# Patient Record
Sex: MALE | Race: WHITE | Employment: OTHER | URBAN - METROPOLITAN AREA
[De-identification: names, ages, dates, MRNs, and addresses within clinical notes are randomized per-mention and may not be internally consistent; named-entity substitution may affect disease eponyms.]

---

## 2017-12-12 ENCOUNTER — OFFICE VISIT (OUTPATIENT)
Dept: CARDIOLOGY CLINIC | Age: 62
End: 2017-12-12

## 2017-12-12 VITALS
BODY MASS INDEX: 27.92 KG/M2 | DIASTOLIC BLOOD PRESSURE: 76 MMHG | WEIGHT: 194.6 LBS | HEART RATE: 50 BPM | SYSTOLIC BLOOD PRESSURE: 112 MMHG

## 2017-12-12 DIAGNOSIS — I10 ESSENTIAL HYPERTENSION, BENIGN: ICD-10-CM

## 2017-12-12 DIAGNOSIS — I45.6 WPW (WOLFF-PARKINSON-WHITE SYNDROME): Primary | ICD-10-CM

## 2017-12-12 DIAGNOSIS — C67.9 MALIGNANT NEOPLASM OF URINARY BLADDER, UNSPECIFIED SITE (HCC): ICD-10-CM

## 2017-12-12 DIAGNOSIS — I47.1 PAROXYSMAL SUPRAVENTRICULAR TACHYCARDIA (HCC): ICD-10-CM

## 2017-12-12 RX ORDER — FINASTERIDE 5 MG/1
5 TABLET, FILM COATED ORAL
COMMUNITY
End: 2019-06-03 | Stop reason: ALTCHOICE

## 2017-12-12 RX ORDER — ATORVASTATIN CALCIUM 20 MG/1
20 TABLET, FILM COATED ORAL DAILY
COMMUNITY

## 2017-12-12 RX ORDER — TAMSULOSIN HYDROCHLORIDE 0.4 MG/1
0.4 CAPSULE ORAL
COMMUNITY

## 2017-12-12 NOTE — PROGRESS NOTES
HISTORY OF PRESENT ILLNESS  Dalton Panchal is a 60 y.o. male. He is referred for preoperative evaluation prior to having removal of bladder cancer by Dr. Mc Blackmon, which is scheduled to be done in less than one week at [facility].  He was seen previously for supraventricular tachycardia and Nancy-Parkinson-White syndrome by Dr. Catherine Solis. He had a normal echo done four years ago. He also had a normal stress test and was able to walk twelve minutes on the Rudolph protocol. He denies chest pain. He is very active. He walks three miles per day and plays tennis, as well as golf. When he plays golf, he walks all eighteen holes. If he stops the atenolol, which he has been on for twenty years, he will have recurrence of the tachycardia. His heart runs slightly slow on the atenolol. He does not smoke cigarettes or drink alcohol to excess. Family history is noncontributory. HPI  Patient Active Problem List   Diagnosis Code    Paroxysmal supraventricular tachycardia (HCC) I47.1    Other and unspecified hyperlipidemia E78.5    Essential hypertension, benign I10    Chest pain, unspecified R07.9     Current Outpatient Prescriptions   Medication Sig Dispense Refill    atorvastatin (LIPITOR) 20 mg tablet Take  by mouth daily.  tamsulosin (FLOMAX) 0.4 mg capsule Take 0.4 mg by mouth daily.  finasteride (PROSCAR) 5 mg tablet Take 5 mg by mouth daily.  aspirin 81 mg tablet Take  by mouth daily.  atenolol (TENORMIN) 25 mg tablet Take 25 mg by mouth daily.  simvastatin (ZOCOR) 20 mg tablet Take 20 mg by mouth daily. Past Medical History:   Diagnosis Date    Bladder cancer ([facility] [address]     WPW (Nancy-Parkinson-White syndrome)      History reviewed. No pertinent surgical history. Review of Systems   Genitourinary: Positive for hematuria. All other systems reviewed and are negative.     Visit Vitals    /76 (BP 1 Location: Left arm, BP Patient Position: Sitting)  Pulse (!) 50    Wt 194 lb 9.6 oz (88.3 kg)    BMI 27.92 kg/m2       Physical Exam   Constitutional: He is oriented to person, place, and time. He appears well-nourished. HENT:   Head: Atraumatic. Eyes: Conjunctivae are normal.   Neck: Neck supple. Cardiovascular: Regular rhythm and normal heart sounds. Bradycardia present. Exam reveals no gallop and no friction rub. No murmur heard. Pulmonary/Chest: Breath sounds normal. He has no wheezes. Abdominal: Bowel sounds are normal. There is no tenderness. Musculoskeletal: He exhibits no edema. Neurological: He is oriented to person, place, and time. Skin: Skin is dry. Psychiatric: His behavior is normal.   Nursing note and vitals reviewed. ASSESSMENT and PLAN  He has a history of tachycardia and Nancy-Parkinson-White syndrome, but it is controlled on his low-dose betablocker. He may have sick sinus syndrome as well in that he does run a slightly reduced heart rate. He has no evidence for coronary disease and has no history of this. He should do well with his bladder cancer surgery and I feel that he can proceed with the surgery without any further testing. I will see him back on an as-needed basis.

## 2017-12-12 NOTE — MR AVS SNAPSHOT
Visit Information Date & Time Provider Department Dept. Phone Encounter #  
 12/12/2017 10:20 AM Theodora Carrillo MD [facility] [place] [phone] [id number] Your Appointments 12/12/2017 10:20 AM  
New Patient with Theodora Carrillo MD  
[facility] ([facility]) Appt Note: Prev Dr. Jessie Agrawal Clearance/Records in CC/Calling to verify ins 12/8 ricki; Prev Dr. aNif Yao Cancer/Records in CC/Calling to verify ins 12/8 [place] [address]  
[address] [facility],[address] [facility] [address] Upcoming Health Maintenance Date Due Hepatitis C Screening 1955 DTaP/Tdap/Td series (1 - Tdap) 1/25/1975 FOBT Q 1 YEAR AGE 50-75 1/25/2004 ZOSTER VACCINE AGE 60> 11/25/2013 Influenza Age 5 to Adult 8/1/2017 Allergies as of 12/12/2017  Review Complete On: 12/12/2017 By: Esperanza Carey No Known Allergies Current Immunizations  Never Reviewed No immunizations on file. Not reviewed this visit You Were Diagnosed With   
  
 Codes Comments Paroxysmal supraventricular tachycardia (HCC)    -  Primary ICD-10-CM: I47.1 ICD-9-CM: 427.0 Essential hypertension, benign     ICD-10-CM: I10 
ICD-9-CM: 401.1 Vitals BP Pulse Weight(growth percentile) BMI Smoking Status 112/76 (BP 1 Location: Left arm, BP Patient Position: Sitting) (!) 50 194 lb 9.6 oz (88.3 kg) 27.92 kg/m2 Former Smoker Vitals History BMI and BSA Data Body Mass Index Body Surface Area  
 27.92 kg/m 2 2.09 m 2 Preferred Pharmacy Pharmacy Name Phone [id number] [address] [facility] [phone] Your Updated Medication List  
  
   
This list is accurate as of: 12/12/17 10:19 AM.  Always use your most recent med list.  
  
  
  
  
 aspirin 81 mg tablet Take  by mouth daily. atorvastatin 20 mg tablet Commonly known as:  LIPITOR Take  by mouth daily. finasteride 5 mg tablet Commonly known as:  PROSCAR Take 5 mg by mouth daily. FLOMAX 0.4 mg capsule Generic drug:  tamsulosin Take 0.4 mg by mouth daily. TENORMIN 25 mg tablet Generic drug:  atenolol Take 25 mg by mouth daily. ZOCOR 20 mg tablet Generic drug:  simvastatin Take 20 mg by mouth daily. We Performed the Following AMB POC EKG ROUTINE W/ 12 LEADS, INTER & REP [35141 CPT(R)] To-Do List   
 12/13/2017 2:00 PM  
  Appointment with [facility] PAT EXAM RM 1 at [facility] ([phone]) Introducing [facility] & HEALTH SERVICES! Lucina Slaughter introduces [employer] patient portal. Now you can access parts of your medical record, email your doctor's office, and request medication refills online. 1. In your internet browser, go to [email or web address] 2. Click on the First Time User? Click Here link in the Sign In box. You will see the New Member Sign Up page. 3. Enter your [employer] Access Code exactly as it appears below. You will not need to use this code after youve completed the sign-up process. If you do not sign up before the expiration date, you must request a new code. · [employer] Access Code: [email or web address] Expires: 3/12/2018 10:19 AM 
 
4. Enter the last four digits of your Social Security Number (xxxx) and Date of Birth (mm/dd/yyyy) as indicated and click Submit. You will be taken to the next sign-up page. 5. Create a Roomixert ID. This will be your [employer] login ID and cannot be changed, so think of one that is secure and easy to remember. 6. Create a [employer] password. You can change your password at any time. 7. Enter your Password Reset Question and Answer. This can be used at a later time if you forget your password. 8. Enter your e-mail address.  You will receive e-mail notification when new information is available in [employer]. 9. Click Sign Up. You can now view and download portions of your medical record. 10. Click the Download Summary menu link to download a portable copy of your medical information. If you have questions, please visit the Frequently Asked Questions section of the [employer] website. Remember, [employer] is NOT to be used for urgent needs. For medical emergencies, dial 911. Now available from your iPhone and Android! Please provide this summary of care documentation to your next provider. Your primary care clinician is listed as Megan Parekhgle. If you have any questions after today's visit, please call [phone].

## 2017-12-13 ENCOUNTER — HOSPITAL ENCOUNTER (OUTPATIENT)
Dept: PREADMISSION TESTING | Age: 62
Discharge: HOME | End: 2017-12-13
Payer: COMMERCIAL

## 2017-12-13 ENCOUNTER — HOSPITAL ENCOUNTER (OUTPATIENT)
Dept: GENERAL RADIOLOGY | Age: 62
Discharge: HOME | End: 2017-12-13
Attending: UROLOGY
Payer: COMMERCIAL

## 2017-12-13 VITALS
HEIGHT: 68 IN | DIASTOLIC BLOOD PRESSURE: 73 MMHG | WEIGHT: 196.5 LBS | TEMPERATURE: 97.8 F | HEART RATE: 60 BPM | SYSTOLIC BLOOD PRESSURE: 120 MMHG | RESPIRATION RATE: 18 BRPM | BODY MASS INDEX: 29.78 KG/M2

## 2017-12-13 LAB
ALBUMIN SERPL-MCNC: 3.8 G/DL (ref 3.5–5)
ALBUMIN/GLOB SERPL: 1.1 {RATIO} (ref 1.1–2.2)
ALP SERPL-CCNC: 105 U/L (ref 45–117)
ALT SERPL-CCNC: 42 U/L (ref 12–78)
ANION GAP SERPL CALC-SCNC: 6 MMOL/L (ref 5–15)
APPEARANCE UR: CLEAR
APTT PPP: 29.2 SEC (ref 22.1–32.5)
AST SERPL-CCNC: 29 U/L (ref 15–37)
BACTERIA URNS QL MICRO: NEGATIVE /HPF
BASOPHILS # BLD: 0 K/UL (ref 0–0.1)
BASOPHILS NFR BLD: 0 % (ref 0–1)
BILIRUB SERPL-MCNC: 0.4 MG/DL (ref 0.2–1)
BILIRUB UR QL: NEGATIVE
BUN SERPL-MCNC: 24 MG/DL (ref 6–20)
BUN/CREAT SERPL: 21 (ref 12–20)
CALCIUM SERPL-MCNC: 8.7 MG/DL (ref 8.5–10.1)
CHLORIDE SERPL-SCNC: 103 MMOL/L (ref 97–108)
CO2 SERPL-SCNC: 31 MMOL/L (ref 21–32)
COLOR UR: NORMAL
CREAT SERPL-MCNC: 1.17 MG/DL (ref 0.7–1.3)
EOSINOPHIL # BLD: 0.1 K/UL (ref 0–0.4)
EOSINOPHIL NFR BLD: 2 % (ref 0–7)
EPITH CASTS URNS QL MICRO: NORMAL /LPF
ERYTHROCYTE [DISTWIDTH] IN BLOOD BY AUTOMATED COUNT: 12.3 % (ref 11.5–14.5)
GLOBULIN SER CALC-MCNC: 3.4 G/DL (ref 2–4)
GLUCOSE SERPL-MCNC: 109 MG/DL (ref 65–100)
GLUCOSE UR STRIP.AUTO-MCNC: NEGATIVE MG/DL
HCT VFR BLD AUTO: 42.3 % (ref 36.6–50.3)
HGB BLD-MCNC: 14 G/DL (ref 12.1–17)
HGB UR QL STRIP: NEGATIVE
HYALINE CASTS URNS QL MICRO: NORMAL /LPF (ref 0–5)
INR PPP: 1.1 (ref 0.9–1.1)
KETONES UR QL STRIP.AUTO: NEGATIVE MG/DL
LEUKOCYTE ESTERASE UR QL STRIP.AUTO: NEGATIVE
LYMPHOCYTES # BLD: 2.3 K/UL (ref 0.8–3.5)
LYMPHOCYTES NFR BLD: 31 % (ref 12–49)
MCH RBC QN AUTO: 27.8 PG (ref 26–34)
MCHC RBC AUTO-ENTMCNC: 33.1 G/DL (ref 30–36.5)
MCV RBC AUTO: 84.1 FL (ref 80–99)
MONOCYTES # BLD: 0.5 K/UL (ref 0–1)
MONOCYTES NFR BLD: 7 % (ref 5–13)
NEUTS SEG # BLD: 4.4 K/UL (ref 1.8–8)
NEUTS SEG NFR BLD: 60 % (ref 32–75)
NITRITE UR QL STRIP.AUTO: NEGATIVE
PH UR STRIP: 6.5 [PH] (ref 5–8)
PLATELET # BLD AUTO: 216 K/UL (ref 150–400)
POTASSIUM SERPL-SCNC: 4.4 MMOL/L (ref 3.5–5.1)
PROT SERPL-MCNC: 7.2 G/DL (ref 6.4–8.2)
PROT UR STRIP-MCNC: NEGATIVE MG/DL
PROTHROMBIN TIME: 11 SEC (ref 9–11.1)
RBC # BLD AUTO: 5.03 M/UL (ref 4.1–5.7)
RBC #/AREA URNS HPF: NORMAL /HPF (ref 0–5)
SODIUM SERPL-SCNC: 140 MMOL/L (ref 136–145)
SP GR UR REFRACTOMETRY: 1.01 (ref 1–1.03)
THERAPEUTIC RANGE,PTTT: NORMAL SECS (ref 58–77)
UROBILINOGEN UR QL STRIP.AUTO: 0.2 EU/DL (ref 0.2–1)
WBC # BLD AUTO: 7.3 K/UL (ref 4.1–11.1)
WBC URNS QL MICRO: NORMAL /HPF (ref 0–4)

## 2017-12-13 PROCEDURE — 85730 THROMBOPLASTIN TIME PARTIAL: CPT | Performed by: UROLOGY

## 2017-12-13 PROCEDURE — 80053 COMPREHEN METABOLIC PANEL: CPT | Performed by: UROLOGY

## 2017-12-13 PROCEDURE — 81001 URINALYSIS AUTO W/SCOPE: CPT | Performed by: UROLOGY

## 2017-12-13 PROCEDURE — 36415 COLL VENOUS BLD VENIPUNCTURE: CPT | Performed by: UROLOGY

## 2017-12-13 PROCEDURE — 87086 URINE CULTURE/COLONY COUNT: CPT | Performed by: UROLOGY

## 2017-12-13 PROCEDURE — 85025 COMPLETE CBC W/AUTO DIFF WBC: CPT | Performed by: UROLOGY

## 2017-12-13 PROCEDURE — 71020 XR CHEST PA LAT: CPT

## 2017-12-13 PROCEDURE — 85610 PROTHROMBIN TIME: CPT | Performed by: UROLOGY

## 2017-12-13 NOTE — PERIOP NOTES
PATIENT GIVEN SURGICAL SITE INFECTION FAQS INFORMATION HANDOUT. PT HAS BEEN GIVEN THE OPPORTUNITY TO ASK ADDITIONAL QUESTIONS.     PT WAS GIVEN DIRECTIONS TO HAVE CXR COMPLETED

## 2017-12-14 NOTE — PERIOP NOTES
FOLLOW UP:    PAT TEST RESULTS FAXED TO DR. Francisco Mercado OFFICE; MESSAGE LEFT WITH  FOR NURSE TO REVIEW LABS AND CXR RESULTS. PAT PHONE NUMBER GIVEN FOR RETURN CALL IF NEEDED.

## 2017-12-14 NOTE — PERIOP NOTES
FOLLOW UP:    April NURSE WITH DR. Barr [facility] OFFICE, LEFT  STATING THE \"LABS AND CXR HAVE BEEN REVIEWED AND SIGNED OFF BY \"

## 2017-12-15 LAB
BACTERIA SPEC CULT: NORMAL
CC UR VC: NORMAL
SERVICE CMNT-IMP: NORMAL

## 2017-12-18 ENCOUNTER — ANESTHESIA (OUTPATIENT)
Dept: SURGERY | Age: 62
End: 2017-12-18
Payer: COMMERCIAL

## 2017-12-18 ENCOUNTER — HOSPITAL ENCOUNTER (OUTPATIENT)
Age: 62
Setting detail: OBSERVATION
Discharge: HOME | End: 2017-12-19
Attending: UROLOGY | Admitting: UROLOGY
Payer: COMMERCIAL

## 2017-12-18 ENCOUNTER — ANESTHESIA EVENT (OUTPATIENT)
Dept: SURGERY | Age: 62
End: 2017-12-18
Payer: COMMERCIAL

## 2017-12-18 PROBLEM — N32.89 BLADDER MASS: Status: ACTIVE | Noted: 2017-12-18

## 2017-12-18 LAB
ALBUMIN SERPL-MCNC: 3.6 G/DL (ref 3.5–5)
ALBUMIN/GLOB SERPL: 1.2 {RATIO} (ref 1.1–2.2)
ALP SERPL-CCNC: 81 U/L (ref 45–117)
ALT SERPL-CCNC: 36 U/L (ref 12–78)
ANION GAP SERPL CALC-SCNC: 6 MMOL/L (ref 5–15)
AST SERPL-CCNC: 27 U/L (ref 15–37)
BILIRUB SERPL-MCNC: 0.3 MG/DL (ref 0.2–1)
BUN SERPL-MCNC: 21 MG/DL (ref 6–20)
BUN/CREAT SERPL: 16 (ref 12–20)
CALCIUM SERPL-MCNC: 8.7 MG/DL (ref 8.5–10.1)
CHLORIDE SERPL-SCNC: 104 MMOL/L (ref 97–108)
CO2 SERPL-SCNC: 28 MMOL/L (ref 21–32)
CREAT SERPL-MCNC: 1.34 MG/DL (ref 0.7–1.3)
GLOBULIN SER CALC-MCNC: 3.1 G/DL (ref 2–4)
GLUCOSE SERPL-MCNC: 125 MG/DL (ref 65–100)
POTASSIUM SERPL-SCNC: 4.4 MMOL/L (ref 3.5–5.1)
PROT SERPL-MCNC: 6.7 G/DL (ref 6.4–8.2)
SODIUM SERPL-SCNC: 138 MMOL/L (ref 136–145)

## 2017-12-18 PROCEDURE — 77030012893

## 2017-12-18 PROCEDURE — 77030013079 HC BLNKT BAIR HGGR 3M -A: Performed by: ANESTHESIOLOGY

## 2017-12-18 PROCEDURE — 77030021162 HC TU IRR ENDOSC BAXT -A: Performed by: UROLOGY

## 2017-12-18 PROCEDURE — 77030018846 HC SOL IRR STRL H20 ICUM -A: Performed by: UROLOGY

## 2017-12-18 PROCEDURE — 99218 HC RM OBSERVATION: CPT

## 2017-12-18 PROCEDURE — 74011250636 HC RX REV CODE- 250/636

## 2017-12-18 PROCEDURE — 77030032490 HC SLV COMPR SCD KNE COVD -B: Performed by: UROLOGY

## 2017-12-18 PROCEDURE — 80053 COMPREHEN METABOLIC PANEL: CPT | Performed by: UROLOGY

## 2017-12-18 PROCEDURE — 77030011274 HC ELECTRD CUT LP RWOL -F: Performed by: UROLOGY

## 2017-12-18 PROCEDURE — 36415 COLL VENOUS BLD VENIPUNCTURE: CPT | Performed by: UROLOGY

## 2017-12-18 PROCEDURE — 76210000017 HC OR PH I REC 1.5 TO 2 HR: Performed by: UROLOGY

## 2017-12-18 PROCEDURE — 74011250637 HC RX REV CODE- 250/637: Performed by: UROLOGY

## 2017-12-18 PROCEDURE — 74011250636 HC RX REV CODE- 250/636: Performed by: UROLOGY

## 2017-12-18 PROCEDURE — 74011000258 HC RX REV CODE- 258: Performed by: UROLOGY

## 2017-12-18 PROCEDURE — 74011000250 HC RX REV CODE- 250

## 2017-12-18 PROCEDURE — 77030005521 HC CATH URETH FOL38 BARD -B: Performed by: UROLOGY

## 2017-12-18 PROCEDURE — 77030020782 HC GWN BAIR PAWS FLX 3M -B

## 2017-12-18 PROCEDURE — 77030010545

## 2017-12-18 PROCEDURE — 77030018830 HC SOL IRR GLYC ICUM-A: Performed by: UROLOGY

## 2017-12-18 PROCEDURE — 74011250636 HC RX REV CODE- 250/636: Performed by: ANESTHESIOLOGY

## 2017-12-18 PROCEDURE — 76060000033 HC ANESTHESIA 1 TO 1.5 HR: Performed by: UROLOGY

## 2017-12-18 PROCEDURE — 76010000149 HC OR TIME 1 TO 1.5 HR: Performed by: UROLOGY

## 2017-12-18 PROCEDURE — 77030011640 HC PAD GRND REM COVD -A: Performed by: UROLOGY

## 2017-12-18 PROCEDURE — 77030021707 HC SET IRR FLD WRMR 3M -B: Performed by: UROLOGY

## 2017-12-18 PROCEDURE — C1769 GUIDE WIRE: HCPCS | Performed by: UROLOGY

## 2017-12-18 PROCEDURE — 77030010509 HC AIRWY LMA MSK TELE -A: Performed by: ANESTHESIOLOGY

## 2017-12-18 PROCEDURE — 74011000250 HC RX REV CODE- 250: Performed by: UROLOGY

## 2017-12-18 PROCEDURE — 77010033678 HC OXYGEN DAILY

## 2017-12-18 RX ORDER — PROPOFOL 10 MG/ML
INJECTION, EMULSION INTRAVENOUS AS NEEDED
Status: DISCONTINUED | OUTPATIENT
Start: 2017-12-18 | End: 2017-12-18 | Stop reason: HOSPADM

## 2017-12-18 RX ORDER — SODIUM CHLORIDE 9 MG/ML
25 INJECTION, SOLUTION INTRAVENOUS CONTINUOUS
Status: DISCONTINUED | OUTPATIENT
Start: 2017-12-18 | End: 2017-12-18 | Stop reason: HOSPADM

## 2017-12-18 RX ORDER — SODIUM CHLORIDE, SODIUM LACTATE, POTASSIUM CHLORIDE, CALCIUM CHLORIDE 600; 310; 30; 20 MG/100ML; MG/100ML; MG/100ML; MG/100ML
75 INJECTION, SOLUTION INTRAVENOUS CONTINUOUS
Status: DISCONTINUED | OUTPATIENT
Start: 2017-12-18 | End: 2017-12-18 | Stop reason: SDUPTHER

## 2017-12-18 RX ORDER — FENTANYL CITRATE 50 UG/ML
25 INJECTION, SOLUTION INTRAMUSCULAR; INTRAVENOUS
Status: COMPLETED | OUTPATIENT
Start: 2017-12-18 | End: 2017-12-18

## 2017-12-18 RX ORDER — CEFAZOLIN SODIUM/WATER 2 G/20 ML
2 SYRINGE (ML) INTRAVENOUS ONCE
Status: COMPLETED | OUTPATIENT
Start: 2017-12-18 | End: 2017-12-18

## 2017-12-18 RX ORDER — HYDROCODONE BITARTRATE AND ACETAMINOPHEN 5; 325 MG/1; MG/1
1 TABLET ORAL
Status: DISCONTINUED | OUTPATIENT
Start: 2017-12-18 | End: 2017-12-19 | Stop reason: HOSPADM

## 2017-12-18 RX ORDER — ONDANSETRON 2 MG/ML
INJECTION INTRAMUSCULAR; INTRAVENOUS AS NEEDED
Status: DISCONTINUED | OUTPATIENT
Start: 2017-12-18 | End: 2017-12-18 | Stop reason: HOSPADM

## 2017-12-18 RX ORDER — DEXTROSE MONOHYDRATE AND SODIUM CHLORIDE 5; .45 G/100ML; G/100ML
100 INJECTION, SOLUTION INTRAVENOUS CONTINUOUS
Status: DISCONTINUED | OUTPATIENT
Start: 2017-12-18 | End: 2017-12-19 | Stop reason: HOSPADM

## 2017-12-18 RX ORDER — LIDOCAINE HYDROCHLORIDE 20 MG/ML
INJECTION, SOLUTION EPIDURAL; INFILTRATION; INTRACAUDAL; PERINEURAL AS NEEDED
Status: DISCONTINUED | OUTPATIENT
Start: 2017-12-18 | End: 2017-12-18 | Stop reason: HOSPADM

## 2017-12-18 RX ORDER — ONDANSETRON 2 MG/ML
4 INJECTION INTRAMUSCULAR; INTRAVENOUS AS NEEDED
Status: DISCONTINUED | OUTPATIENT
Start: 2017-12-18 | End: 2017-12-18 | Stop reason: SDUPTHER

## 2017-12-18 RX ORDER — OXYBUTYNIN CHLORIDE 5 MG/1
5 TABLET ORAL 3 TIMES DAILY
Status: DISCONTINUED | OUTPATIENT
Start: 2017-12-18 | End: 2017-12-19 | Stop reason: HOSPADM

## 2017-12-18 RX ORDER — MIDAZOLAM HYDROCHLORIDE 1 MG/ML
1 INJECTION, SOLUTION INTRAMUSCULAR; INTRAVENOUS AS NEEDED
Status: DISCONTINUED | OUTPATIENT
Start: 2017-12-18 | End: 2017-12-18 | Stop reason: HOSPADM

## 2017-12-18 RX ORDER — ROPIVACAINE HYDROCHLORIDE 5 MG/ML
30 INJECTION, SOLUTION EPIDURAL; INFILTRATION; PERINEURAL ONCE
Status: DISCONTINUED | OUTPATIENT
Start: 2017-12-18 | End: 2017-12-18 | Stop reason: HOSPADM

## 2017-12-18 RX ORDER — FENTANYL CITRATE 50 UG/ML
50 INJECTION, SOLUTION INTRAMUSCULAR; INTRAVENOUS AS NEEDED
Status: DISCONTINUED | OUTPATIENT
Start: 2017-12-18 | End: 2017-12-18 | Stop reason: HOSPADM

## 2017-12-18 RX ORDER — SODIUM CHLORIDE 0.9 % (FLUSH) 0.9 %
5-10 SYRINGE (ML) INJECTION EVERY 8 HOURS
Status: DISCONTINUED | OUTPATIENT
Start: 2017-12-18 | End: 2017-12-18 | Stop reason: HOSPADM

## 2017-12-18 RX ORDER — SODIUM CHLORIDE 0.9 % (FLUSH) 0.9 %
5-10 SYRINGE (ML) INJECTION AS NEEDED
Status: DISCONTINUED | OUTPATIENT
Start: 2017-12-18 | End: 2017-12-18 | Stop reason: HOSPADM

## 2017-12-18 RX ORDER — FENTANYL CITRATE 50 UG/ML
INJECTION, SOLUTION INTRAMUSCULAR; INTRAVENOUS AS NEEDED
Status: DISCONTINUED | OUTPATIENT
Start: 2017-12-18 | End: 2017-12-18 | Stop reason: HOSPADM

## 2017-12-18 RX ORDER — ATROPA BELLADONNA AND OPIUM 16.2; 6 MG/1; MG/1
1 SUPPOSITORY RECTAL
Status: DISCONTINUED | OUTPATIENT
Start: 2017-12-18 | End: 2017-12-19 | Stop reason: HOSPADM

## 2017-12-18 RX ORDER — EPHEDRINE SULFATE 50 MG/ML
INJECTION, SOLUTION INTRAVENOUS AS NEEDED
Status: DISCONTINUED | OUTPATIENT
Start: 2017-12-18 | End: 2017-12-18 | Stop reason: HOSPADM

## 2017-12-18 RX ORDER — ACETAMINOPHEN 10 MG/ML
INJECTION, SOLUTION INTRAVENOUS AS NEEDED
Status: DISCONTINUED | OUTPATIENT
Start: 2017-12-18 | End: 2017-12-18 | Stop reason: HOSPADM

## 2017-12-18 RX ORDER — MIDAZOLAM HYDROCHLORIDE 1 MG/ML
0.5 INJECTION, SOLUTION INTRAMUSCULAR; INTRAVENOUS
Status: DISCONTINUED | OUTPATIENT
Start: 2017-12-18 | End: 2017-12-18 | Stop reason: SDUPTHER

## 2017-12-18 RX ORDER — DEXAMETHASONE SODIUM PHOSPHATE 4 MG/ML
INJECTION, SOLUTION INTRA-ARTICULAR; INTRALESIONAL; INTRAMUSCULAR; INTRAVENOUS; SOFT TISSUE AS NEEDED
Status: DISCONTINUED | OUTPATIENT
Start: 2017-12-18 | End: 2017-12-18 | Stop reason: HOSPADM

## 2017-12-18 RX ORDER — ROCURONIUM BROMIDE 10 MG/ML
INJECTION, SOLUTION INTRAVENOUS AS NEEDED
Status: DISCONTINUED | OUTPATIENT
Start: 2017-12-18 | End: 2017-12-18 | Stop reason: HOSPADM

## 2017-12-18 RX ORDER — SODIUM CHLORIDE, SODIUM LACTATE, POTASSIUM CHLORIDE, CALCIUM CHLORIDE 600; 310; 30; 20 MG/100ML; MG/100ML; MG/100ML; MG/100ML
50 INJECTION, SOLUTION INTRAVENOUS CONTINUOUS
Status: DISCONTINUED | OUTPATIENT
Start: 2017-12-18 | End: 2017-12-18 | Stop reason: HOSPADM

## 2017-12-18 RX ORDER — PHENAZOPYRIDINE HYDROCHLORIDE 100 MG/1
100 TABLET, FILM COATED ORAL 3 TIMES DAILY
Status: DISCONTINUED | OUTPATIENT
Start: 2017-12-18 | End: 2017-12-19 | Stop reason: HOSPADM

## 2017-12-18 RX ORDER — SODIUM CHLORIDE, SODIUM LACTATE, POTASSIUM CHLORIDE, CALCIUM CHLORIDE 600; 310; 30; 20 MG/100ML; MG/100ML; MG/100ML; MG/100ML
125 INJECTION, SOLUTION INTRAVENOUS CONTINUOUS
Status: DISCONTINUED | OUTPATIENT
Start: 2017-12-18 | End: 2017-12-18 | Stop reason: HOSPADM

## 2017-12-18 RX ORDER — LIDOCAINE HYDROCHLORIDE 10 MG/ML
0.1 INJECTION, SOLUTION EPIDURAL; INFILTRATION; INTRACAUDAL; PERINEURAL AS NEEDED
Status: DISCONTINUED | OUTPATIENT
Start: 2017-12-18 | End: 2017-12-18 | Stop reason: HOSPADM

## 2017-12-18 RX ORDER — MIDAZOLAM HYDROCHLORIDE 1 MG/ML
INJECTION, SOLUTION INTRAMUSCULAR; INTRAVENOUS AS NEEDED
Status: DISCONTINUED | OUTPATIENT
Start: 2017-12-18 | End: 2017-12-18 | Stop reason: HOSPADM

## 2017-12-18 RX ORDER — SODIUM CHLORIDE 0.9 % (FLUSH) 0.9 %
5-10 SYRINGE (ML) INJECTION AS NEEDED
Status: DISCONTINUED | OUTPATIENT
Start: 2017-12-18 | End: 2017-12-19 | Stop reason: HOSPADM

## 2017-12-18 RX ORDER — DIPHENHYDRAMINE HYDROCHLORIDE 50 MG/ML
12.5 INJECTION, SOLUTION INTRAMUSCULAR; INTRAVENOUS AS NEEDED
Status: DISCONTINUED | OUTPATIENT
Start: 2017-12-18 | End: 2017-12-18 | Stop reason: SDUPTHER

## 2017-12-18 RX ORDER — ONDANSETRON 2 MG/ML
4 INJECTION INTRAMUSCULAR; INTRAVENOUS AS NEEDED
Status: DISCONTINUED | OUTPATIENT
Start: 2017-12-18 | End: 2017-12-18 | Stop reason: HOSPADM

## 2017-12-18 RX ORDER — SODIUM CHLORIDE, SODIUM LACTATE, POTASSIUM CHLORIDE, CALCIUM CHLORIDE 600; 310; 30; 20 MG/100ML; MG/100ML; MG/100ML; MG/100ML
75 INJECTION, SOLUTION INTRAVENOUS CONTINUOUS
Status: DISCONTINUED | OUTPATIENT
Start: 2017-12-18 | End: 2017-12-18 | Stop reason: HOSPADM

## 2017-12-18 RX ORDER — DIPHENHYDRAMINE HYDROCHLORIDE 50 MG/ML
12.5 INJECTION, SOLUTION INTRAMUSCULAR; INTRAVENOUS AS NEEDED
Status: DISCONTINUED | OUTPATIENT
Start: 2017-12-18 | End: 2017-12-18 | Stop reason: HOSPADM

## 2017-12-18 RX ORDER — MORPHINE SULFATE 10 MG/ML
2 INJECTION, SOLUTION INTRAMUSCULAR; INTRAVENOUS
Status: DISCONTINUED | OUTPATIENT
Start: 2017-12-18 | End: 2017-12-18 | Stop reason: HOSPADM

## 2017-12-18 RX ORDER — FENTANYL CITRATE 50 UG/ML
INJECTION, SOLUTION INTRAMUSCULAR; INTRAVENOUS
Status: DISCONTINUED
Start: 2017-12-18 | End: 2017-12-18

## 2017-12-18 RX ORDER — LORAZEPAM 2 MG/ML
1 INJECTION INTRAMUSCULAR
Status: DISCONTINUED | OUTPATIENT
Start: 2017-12-18 | End: 2017-12-19 | Stop reason: HOSPADM

## 2017-12-18 RX ORDER — FENTANYL CITRATE 50 UG/ML
25 INJECTION, SOLUTION INTRAMUSCULAR; INTRAVENOUS
Status: DISCONTINUED | OUTPATIENT
Start: 2017-12-18 | End: 2017-12-18 | Stop reason: SDUPTHER

## 2017-12-18 RX ORDER — MIDAZOLAM HYDROCHLORIDE 1 MG/ML
0.5 INJECTION, SOLUTION INTRAMUSCULAR; INTRAVENOUS
Status: DISCONTINUED | OUTPATIENT
Start: 2017-12-18 | End: 2017-12-18 | Stop reason: HOSPADM

## 2017-12-18 RX ORDER — ATENOLOL 25 MG/1
25 TABLET ORAL DAILY
Status: DISCONTINUED | OUTPATIENT
Start: 2017-12-19 | End: 2017-12-19 | Stop reason: HOSPADM

## 2017-12-18 RX ORDER — BACITRACIN 500 UNIT/G
1 PACKET (EA) TOPICAL 3 TIMES DAILY
Status: DISCONTINUED | OUTPATIENT
Start: 2017-12-18 | End: 2017-12-19 | Stop reason: HOSPADM

## 2017-12-18 RX ORDER — MORPHINE SULFATE 10 MG/ML
2 INJECTION, SOLUTION INTRAMUSCULAR; INTRAVENOUS
Status: DISCONTINUED | OUTPATIENT
Start: 2017-12-18 | End: 2017-12-18 | Stop reason: SDUPTHER

## 2017-12-18 RX ORDER — SODIUM CHLORIDE 0.9 % (FLUSH) 0.9 %
5-10 SYRINGE (ML) INJECTION EVERY 8 HOURS
Status: DISCONTINUED | OUTPATIENT
Start: 2017-12-18 | End: 2017-12-19 | Stop reason: HOSPADM

## 2017-12-18 RX ORDER — SODIUM CHLORIDE 0.9 % (FLUSH) 0.9 %
5-10 SYRINGE (ML) INJECTION AS NEEDED
Status: DISCONTINUED | OUTPATIENT
Start: 2017-12-18 | End: 2017-12-18 | Stop reason: SDUPTHER

## 2017-12-18 RX ORDER — SUCCINYLCHOLINE CHLORIDE 20 MG/ML
INJECTION INTRAMUSCULAR; INTRAVENOUS AS NEEDED
Status: DISCONTINUED | OUTPATIENT
Start: 2017-12-18 | End: 2017-12-18 | Stop reason: HOSPADM

## 2017-12-18 RX ORDER — SODIUM CHLORIDE 9 MG/ML
25 INJECTION, SOLUTION INTRAVENOUS CONTINUOUS
Status: DISCONTINUED | OUTPATIENT
Start: 2017-12-18 | End: 2017-12-18 | Stop reason: SDUPTHER

## 2017-12-18 RX ADMIN — BACITRACIN 1 PACKET: 500 OINTMENT TOPICAL at 14:22

## 2017-12-18 RX ADMIN — DEXAMETHASONE SODIUM PHOSPHATE 4 MG: 4 INJECTION, SOLUTION INTRA-ARTICULAR; INTRALESIONAL; INTRAMUSCULAR; INTRAVENOUS; SOFT TISSUE at 08:53

## 2017-12-18 RX ADMIN — FENTANYL CITRATE 25 MCG: 50 INJECTION, SOLUTION INTRAMUSCULAR; INTRAVENOUS at 10:18

## 2017-12-18 RX ADMIN — EPHEDRINE SULFATE 10 MG: 50 INJECTION, SOLUTION INTRAVENOUS at 08:47

## 2017-12-18 RX ADMIN — Medication 2 G: at 08:40

## 2017-12-18 RX ADMIN — Medication 10 ML: at 21:25

## 2017-12-18 RX ADMIN — FENTANYL CITRATE 25 MCG: 50 INJECTION, SOLUTION INTRAMUSCULAR; INTRAVENOUS at 10:00

## 2017-12-18 RX ADMIN — BACITRACIN 1 PACKET: 500 OINTMENT TOPICAL at 21:22

## 2017-12-18 RX ADMIN — LIDOCAINE HYDROCHLORIDE 50 MG: 20 INJECTION, SOLUTION EPIDURAL; INFILTRATION; INTRACAUDAL; PERINEURAL at 08:34

## 2017-12-18 RX ADMIN — MIDAZOLAM HYDROCHLORIDE 2 MG: 1 INJECTION, SOLUTION INTRAMUSCULAR; INTRAVENOUS at 08:24

## 2017-12-18 RX ADMIN — MIDAZOLAM HYDROCHLORIDE 1 MG: 1 INJECTION, SOLUTION INTRAMUSCULAR; INTRAVENOUS at 08:33

## 2017-12-18 RX ADMIN — EPHEDRINE SULFATE 10 MG: 50 INJECTION, SOLUTION INTRAVENOUS at 08:53

## 2017-12-18 RX ADMIN — HYDROCODONE BITARTRATE AND ACETAMINOPHEN 1 TABLET: 5; 325 TABLET ORAL at 16:20

## 2017-12-18 RX ADMIN — ACETAMINOPHEN 1000 MG: 10 INJECTION, SOLUTION INTRAVENOUS at 08:48

## 2017-12-18 RX ADMIN — DEXTROSE MONOHYDRATE AND SODIUM CHLORIDE 100 ML/HR: 5; .45 INJECTION, SOLUTION INTRAVENOUS at 21:27

## 2017-12-18 RX ADMIN — EPHEDRINE SULFATE 10 MG: 50 INJECTION, SOLUTION INTRAVENOUS at 09:03

## 2017-12-18 RX ADMIN — OXYBUTYNIN CHLORIDE 5 MG: 5 TABLET ORAL at 14:22

## 2017-12-18 RX ADMIN — HYDROCODONE BITARTRATE AND ACETAMINOPHEN 1 TABLET: 5; 325 TABLET ORAL at 22:46

## 2017-12-18 RX ADMIN — PROPOFOL 200 MG: 10 INJECTION, EMULSION INTRAVENOUS at 08:34

## 2017-12-18 RX ADMIN — MIDAZOLAM HYDROCHLORIDE 2 MG: 1 INJECTION, SOLUTION INTRAMUSCULAR; INTRAVENOUS at 08:23

## 2017-12-18 RX ADMIN — SUCCINYLCHOLINE CHLORIDE 120 MG: 20 INJECTION INTRAMUSCULAR; INTRAVENOUS at 09:27

## 2017-12-18 RX ADMIN — SODIUM CHLORIDE, POTASSIUM CHLORIDE, SODIUM LACTATE AND CALCIUM CHLORIDE: 600; 310; 30; 20 INJECTION, SOLUTION INTRAVENOUS at 08:23

## 2017-12-18 RX ADMIN — PHENAZOPYRIDINE HYDROCHLORIDE 100 MG: 100 TABLET ORAL at 14:22

## 2017-12-18 RX ADMIN — PROPOFOL 50 MG: 10 INJECTION, EMULSION INTRAVENOUS at 08:47

## 2017-12-18 RX ADMIN — DEXTROSE MONOHYDRATE AND SODIUM CHLORIDE 100 ML/HR: 5; .45 INJECTION, SOLUTION INTRAVENOUS at 11:23

## 2017-12-18 RX ADMIN — PHENAZOPYRIDINE HYDROCHLORIDE 100 MG: 100 TABLET ORAL at 21:22

## 2017-12-18 RX ADMIN — FENTANYL CITRATE 50 MCG: 50 INJECTION, SOLUTION INTRAMUSCULAR; INTRAVENOUS at 08:47

## 2017-12-18 RX ADMIN — FENTANYL CITRATE 25 MCG: 50 INJECTION, SOLUTION INTRAMUSCULAR; INTRAVENOUS at 10:07

## 2017-12-18 RX ADMIN — FENTANYL CITRATE 25 MCG: 50 INJECTION, SOLUTION INTRAMUSCULAR; INTRAVENOUS at 10:30

## 2017-12-18 RX ADMIN — ONDANSETRON 4 MG: 2 INJECTION INTRAMUSCULAR; INTRAVENOUS at 09:37

## 2017-12-18 RX ADMIN — FENTANYL CITRATE 25 MCG: 50 INJECTION, SOLUTION INTRAMUSCULAR; INTRAVENOUS at 09:24

## 2017-12-18 RX ADMIN — OXYBUTYNIN CHLORIDE 5 MG: 5 TABLET ORAL at 21:22

## 2017-12-18 RX ADMIN — ROCURONIUM BROMIDE 6 MG: 10 INJECTION, SOLUTION INTRAVENOUS at 09:27

## 2017-12-18 NOTE — ADDENDUM NOTE
Addendum  created 12/18/17 1201 by Macario Campbell CRNA    Anesthesia Event edited, Procedure Event Log accessed

## 2017-12-18 NOTE — IP AVS SNAPSHOT
[address] [facility]  
[phone] Patient: Meme Diallo MRN: [id number] DWZ:5/62/5899 My Medications STOP taking these medications   
 aspirin 81 mg tablet TAKE these medications as instructed Instructions Each Dose to Equal  
 Morning Noon Evening Bedtime  
 atorvastatin 20 mg tablet Commonly known as:  LIPITOR Your last dose was: Your next dose is: Take  by mouth daily. cephALEXin 500 mg capsule Commonly known as:  Lugene Wisam Your last dose was: Your next dose is: Take 1 Cap by mouth four (4) times daily for 5 days. 500 mg  
    
   
   
   
  
 finasteride 5 mg tablet Commonly known as:  PROSCAR Your last dose was: Your next dose is: Take 5 mg by mouth nightly. 5 mg FLOMAX 0.4 mg capsule Generic drug:  tamsulosin Your last dose was: Your next dose is: Take 0.4 mg by mouth nightly. 0.4 mg HYDROcodone-acetaminophen 5-325 mg per tablet Commonly known as:  Clara Huff Your last dose was: Your next dose is: Take 1 Tab by mouth every six (6) hours as needed for Pain. Max Daily Amount: 4 Tabs. Indications: Pain 1 Tab TENORMIN 25 mg tablet Generic drug:  atenolol Your last dose was: Your next dose is: Take 25 mg by mouth daily. 25 mg Where to Get Your Medications Information on where to get these meds will be given to you by the nurse or doctor. ! Ask your nurse or doctor about these medications  
  cephALEXin 500 mg capsule HYDROcodone-acetaminophen 5-325 mg per tablet

## 2017-12-18 NOTE — ANESTHESIA POSTPROCEDURE EVALUATION
Post-Anesthesia Evaluation and Assessment    Patient: Erma Dickens MRN: [id number]  SSN: [id number]    YOB: 1955  Age: 60 y.o. Sex: male       Cardiovascular Function/Vital Signs  Visit Vitals    /76    Pulse 61    Temp 36.6 °C (97.9 °F)    Resp 11    Ht 5' 8\" (1.727 m)    Wt 89.1 kg (196 lb 8 oz)    SpO2 96%    BMI 29.88 kg/m2       Patient is status post general anesthesia for Procedure(s):  CYSTOCSCOPY, INCISION OF URETRHAL STRICTURE, VISUAL URETHROTOMY . Nausea/Vomiting: None    Postoperative hydration reviewed and adequate. Pain:  Pain Scale 1: FLACC (12/18/17 1000)  Pain Intensity 1: 0 (12/18/17 0748)   Managed    Neurological Status:   Neuro (WDL): Within Defined Limits (12/18/17 1123)   At baseline    Mental Status and Level of Consciousness: Arousable    Pulmonary Status:   O2 Device: Nasal cannula (12/18/17 0953)   Adequate oxygenation and airway patent    Complications related to anesthesia: None    Post-anesthesia assessment completed.  No concerns    Signed By: Kristina Ling MD     December 18, 2017

## 2017-12-18 NOTE — PERIOP NOTES
TRANSFER - OUT REPORT:    Verbal report given to [facility] RN(name) on Odalis Allen  being transferred to 522(unit) for routine progression of care       Report consisted of patients Situation, Background, Assessment and   Recommendations(SBAR). Information from the following report(s) SBAR, OR Summary and Intake/Output was reviewed with the receiving nurse. Lines:   Peripheral IV 12/18/17 Left Wrist (Active)   Site Assessment Clean, dry, & intact 12/18/2017  9:53 AM   Phlebitis Assessment 0 12/18/2017  9:53 AM   Infiltration Assessment 0 12/18/2017  9:53 AM   Dressing Status Clean, dry, & intact 12/18/2017  9:53 AM        Opportunity for questions and clarification was provided.       Patient transported with:   [employer]

## 2017-12-18 NOTE — IP AVS SNAPSHOT
[address] [facility] [address] [facility] 
[phone] Patient: Merry Warren MRN: [id number] [facility]:[birth year] About your hospitalization You were admitted on:  December 18, 2017 You last received care in the:  [place] [address] You were discharged on:  December 19, 2017 Why you were hospitalized Your primary diagnosis was:  Not on File Your diagnoses also included:  Bladder Mass Things You Need To Do (next 8 weeks) Follow up with Cherelle Causey MD  
  
Phone:  [phone] Where:  [address] [facility], [address] [facility] Discharge Orders None A check jayden indicates which time of day the medication should be taken. My Medications STOP taking these medications   
 aspirin 81 mg tablet TAKE these medications as instructed Instructions Each Dose to Equal  
 Morning Noon Evening Bedtime  
 atorvastatin 20 mg tablet Commonly known as:  LIPITOR Your last dose was: Your next dose is: Take  by mouth daily. cephALEXin 500 mg capsule Commonly known as:  David Martin Your last dose was: Your next dose is: Take 1 Cap by mouth four (4) times daily for 5 days. 500 mg  
    
   
   
   
  
 finasteride 5 mg tablet Commonly known as:  PROSCAR Your last dose was: Your next dose is: Take 5 mg by mouth nightly. 5 mg FLOMAX 0.4 mg capsule Generic drug:  tamsulosin Your last dose was: Your next dose is: Take 0.4 mg by mouth nightly. 0.4 mg HYDROcodone-acetaminophen 5-325 mg per tablet Commonly known as:  Edroy Boast Your last dose was: Your next dose is: Take 1 Tab by mouth every six (6) hours as needed for Pain. Max Daily Amount: 4 Tabs. Indications: Pain 1 Tab TENORMIN 25 mg tablet Generic drug:  atenolol Your last dose was: Your next dose is: Take 25 mg by mouth daily. 25 mg Where to Get Your Medications Information on where to get these meds will be given to you by the nurse or doctor. ! Ask your nurse or doctor about these medications  
  cephALEXin 500 mg capsule HYDROcodone-acetaminophen 5-325 mg per tablet Discharge Instructions Patient Discharge Instructions Ilya Notice / [id number] : 1955 Admitted 2017 Discharged: 2017 Take Home Medications · It is important that you take the medication exactly as they are prescribed. · Keep your medication in the bottles provided by the pharmacist and keep a list of the medication names, dosages, and times to be taken in your wallet. · Do not take other medications without consulting your doctor. What to do at [facility] Recommended activity: No Lifting for 6 weeks. Follow-up with [facility]. Call for an appointment (if not already scheduled)            [phone]. CALL DINESH, MY  TO GET DETAILS REGARDING READMIT ON Thursday. THANKS Information obtained by : 
I understand that if any problems occur once I am at home I am to contact my physician. I understand and acknowledge receipt of the instructions indicated above. Physician's or R.N.'s Signature                                                                  Date/Time Patient or Representative Signature                                                          Date/Time Introducing [facility] & HEALTH SERVICES! Stanley Abrams introduces [employer] patient portal. Now you can access parts of your medical record, email your doctor's office, and request medication refills online. 1. In your internet browser, go to [email or web address] 2. Click on the First Time User? Click Here link in the Sign In box. You will see the New Member Sign Up page. 3. Enter your [employer] Access Code exactly as it appears below. You will not need to use this code after youve completed the sign-up process. If you do not sign up before the expiration date, you must request a new code. · [employer] Access Code: [email or web address] Expires: 3/12/2018 10:19 AM 
 
4. Enter the last four digits of your Social Security Number (xxxx) and Date of Birth (mm/dd/yyyy) as indicated and click Submit. You will be taken to the next sign-up page. 5. Create a [employer] ID. This will be your [employer] login ID and cannot be changed, so think of one that is secure and easy to remember. 6. Create a [employer] password. You can change your password at any time. 7. Enter your Password Reset Question and Answer. This can be used at a later time if you forget your password. 8. Enter your e-mail address. You will receive e-mail notification when new information is available in [address] 9. Click Sign Up. You can now view and download portions of your medical record. 10. Click the Download Summary menu link to download a portable copy of your medical information. If you have questions, please visit the Frequently Asked Questions section of the [employer] website. Remember, [employer] is NOT to be used for urgent needs. For medical emergencies, dial 911. Now available from your iPhone and Android! Providers Seen During Your Hospitalization Provider Specialty Primary office phone Soni Gabriel MD Urology [phone] Your Primary Care Physician (PCP) Primary Care Physician Office Phone Office Fax [facility] [phone] You are allergic to the following No active allergies Recent Documentation Height Weight BMI Smoking Status 1.727 m 95.5 kg 32.02 kg/m2 Former Smoker Emergency Contacts Name Discharge Info Relation Home Work Mobile [facility] DISCHARGE CAREGIVER [3] Spouse [3] [phone] Patient Belongings The following personal items are in your possession at time of discharge: 
  Dental Appliances: Other (comment) (crowns upper front)  Visual Aid: None             Clothing: Other (comment) (clothing) Please provide this summary of care documentation to your next provider. Signatures-by signing, you are acknowledging that this After Visit Summary has been reviewed with you and you have received a copy. Patient Signature:  ____________________________________________________________ Date:  ____________________________________________________________  
  
[facility] Provider Signature:  ____________________________________________________________ Date:  ____________________________________________________________

## 2017-12-18 NOTE — BRIEF OP NOTE
BRIEF OPERATIVE NOTE    Date of Procedure: 12/18/2017   Preoperative Diagnosis: BLADDER MASS  Postoperative Diagnosis: BLADDER MASS    Procedure(s):  CYSTOCSCOPY, INCISION OF URETRHAL STRICTURE, VISUAL URETHROTOMY   Surgeon(s) and Role:     * Jung Hendricks MD - Primary         Assistant Staff:       Surgical Staff:  Circ-1: Tabby Soto RN  Scrub RN-1: Paty Kiran RN  Event Time In   Incision Start 8666   Incision Close 4190     Anesthesia: General   Estimated Blood Loss: 50  Specimens: * No specimens in log *   Findings: long urethral stricture. Unable to get open enough to pass resectoscope.      Complications: 0  Implants: * No implants in log *

## 2017-12-19 VITALS
TEMPERATURE: 98 F | SYSTOLIC BLOOD PRESSURE: 120 MMHG | BODY MASS INDEX: 31.92 KG/M2 | HEIGHT: 68 IN | DIASTOLIC BLOOD PRESSURE: 70 MMHG | WEIGHT: 210.6 LBS | RESPIRATION RATE: 16 BRPM | HEART RATE: 80 BPM | OXYGEN SATURATION: 98 %

## 2017-12-19 LAB — HGB BLD-MCNC: 12.6 G/DL (ref 12.1–17)

## 2017-12-19 PROCEDURE — 74011250637 HC RX REV CODE- 250/637: Performed by: UROLOGY

## 2017-12-19 PROCEDURE — 99218 HC RM OBSERVATION: CPT

## 2017-12-19 PROCEDURE — 85018 HEMOGLOBIN: CPT | Performed by: UROLOGY

## 2017-12-19 PROCEDURE — 74011000258 HC RX REV CODE- 258: Performed by: UROLOGY

## 2017-12-19 PROCEDURE — 74011000250 HC RX REV CODE- 250: Performed by: UROLOGY

## 2017-12-19 PROCEDURE — 36415 COLL VENOUS BLD VENIPUNCTURE: CPT | Performed by: UROLOGY

## 2017-12-19 PROCEDURE — 77010033678 HC OXYGEN DAILY

## 2017-12-19 RX ORDER — ASPIRIN 81 MG/1
81 TABLET ORAL DAILY
COMMUNITY
End: 2017-12-24

## 2017-12-19 RX ORDER — CEPHALEXIN 500 MG/1
500 CAPSULE ORAL 4 TIMES DAILY
Qty: 20 CAP | Refills: 0 | Status: SHIPPED | OUTPATIENT
Start: 2017-12-19 | End: 2017-12-24

## 2017-12-19 RX ORDER — HYDROCODONE BITARTRATE AND ACETAMINOPHEN 5; 325 MG/1; MG/1
1 TABLET ORAL
Qty: 25 TAB | Refills: 0 | Status: SHIPPED | OUTPATIENT
Start: 2017-12-19 | End: 2019-06-03 | Stop reason: ALTCHOICE

## 2017-12-19 RX ADMIN — OXYBUTYNIN CHLORIDE 5 MG: 5 TABLET ORAL at 09:24

## 2017-12-19 RX ADMIN — PHENAZOPYRIDINE HYDROCHLORIDE 100 MG: 100 TABLET ORAL at 09:15

## 2017-12-19 RX ADMIN — ATENOLOL 25 MG: 25 TABLET ORAL at 09:15

## 2017-12-19 RX ADMIN — Medication 10 ML: at 05:11

## 2017-12-19 RX ADMIN — HYDROCODONE BITARTRATE AND ACETAMINOPHEN 1 TABLET: 5; 325 TABLET ORAL at 05:25

## 2017-12-19 RX ADMIN — BACITRACIN 1 PACKET: 500 OINTMENT TOPICAL at 09:18

## 2017-12-19 RX ADMIN — DEXTROSE MONOHYDRATE AND SODIUM CHLORIDE 100 ML/HR: 5; .45 INJECTION, SOLUTION INTRAVENOUS at 07:20

## 2017-12-19 NOTE — PROGRESS NOTES
Bedside and Verbal shift change report given to [facility], RN (oncoming nurse) by Petey Titus RN (offgoing nurse). Report included the following information SBAR, Kardex, ED Summary, Intake/Output, MAR and Recent Results.

## 2017-12-19 NOTE — OP NOTES
[address] [facility]  OPERATIVE REPORT? Mike Arana  MR#: [id number]  : 1955  ACCOUNT #: [de-identified]   DATE OF SERVICE: 2017    DATE OF PROCEDURE:   2017     PREOPERATIVE DIAGNOSIS:  Bladder tumor, large urethral stricture. POSTOPERATIVE DIAGNOSIS:  same. PROCEDURE: Visual urethrotomy of urethral stricture. COMPLICATIONS:  None. ESTIMATED BLOOD LOSS:  50 mL. SPECIMENS:  None. PROCEDURE:  After anesthesia, the patient was prepped and draped in lithotomy. The 21 cystoscope was passed down the urethra. There was some large bore stricture disease, but the 21 scope was able to be negotiated through the urethra without difficulty. The bladder was carefully examined with the 30 degree and 70 degree lens. There was a large bladder tumor arising from the right lateral wall of the bladder just lateral to the right ureteral orifice. No other tumors were seen. There was a large median lobe bulging into the bladder. The ureteral orifices were visible on both sides. There were telangiectatic dilated blood vessels in the submucosa surrounding the tumor. At this point, the urethra was dilated with Dondra Willis sounds to 25 [place]; however, larger Krupa Pinch Buren's would not pass. A wire was then inserted and the visual urethrotome was placed and the urethra was incised in the 12 o'clock position. Then, the [place] sounds were used to dilate further, but again dilation could only reach 22 or 24 Western Ann. I was unable to pass a 26 dilator. An attempt to pass the resectoscope visually met with significant resistance and for that reason, it was felt appropriate to place a Pack catheter temporarily to allow the urethra to self-dilate and then make another attempt to get back in, in future. A 22-[place] catheter was placed over the wire and into the bladder and irrigated clear.   The patient was reacted from anesthesia and transferred to recovery in stable condition.       MD Kinsey Stone / Jacoby Almazan  D: 12/18/2017 10:45     T: 12/18/2017 20:04  JOB #: [id number]  CC: [address] [place] [address] x  [address] [place] [address]

## 2017-12-19 NOTE — PROGRESS NOTES
vss af abd soft. Pain contrilled. Urine clear. Plan dc and readmit on thurs for another attempt to get 26 f resectascope in .

## 2017-12-19 NOTE — DISCHARGE INSTRUCTIONS
DISCHARGE SUMMARY from Nurse    PATIENT INSTRUCTIONS:    After general anesthesia or intravenous sedation, for 24 hours or while taking prescription Narcotics:  · Limit your activities  · Do not drive and operate hazardous machinery  · Do not make important personal or business decisions  · Do  not drink alcoholic beverages  · If you have not urinated within 8 hours after discharge, please contact your surgeon on call. Report the following to your surgeon:  · Excessive pain, swelling, redness or odor of or around the surgical area  · Temperature over 100.5  · Nausea and vomiting lasting longer than 4 hours or if unable to take medications  · Any signs of decreased circulation or nerve impairment to extremity: change in color, persistent  numbness, tingling, coldness or increase pain  · Any questions    What to do at Home:  Recommended activity: per instructions    If you experience any of the following symptoms per instructions, please follow up with per instructions. *  Please give a list of your current medications to your Primary Care Provider. *  Please update this list whenever your medications are discontinued, doses are      changed, or new medications (including over-the-counter products) are added. *  Please carry medication information at all times in case of emergency situations. These are general instructions for a healthy lifestyle:    No smoking/ No tobacco products/ Avoid exposure to second hand smoke  Surgeon General's Warning:  Quitting smoking now greatly reduces serious risk to your health.     Obesity, smoking, and sedentary lifestyle greatly increases your risk for illness    A healthy diet, regular physical exercise & weight monitoring are important for maintaining a healthy lifestyle    You may be retaining fluid if you have a history of heart failure or if you experience any of the following symptoms:  Weight gain of 3 pounds or more overnight or 5 pounds in a week, increased swelling in our hands or feet or shortness of breath while lying flat in bed. Please call your doctor as soon as you notice any of these symptoms; do not wait until your next office visit. Recognize signs and symptoms of STROKE:    F-face looks uneven    A-arms unable to move or move unevenly    S-speech slurred or non-existent    T-time-call 911 as soon as signs and symptoms begin-DO NOT go       Back to bed or wait to see if you get better-TIME IS BRAIN. Warning Signs of HEART ATTACK     Call 911 if you have these symptoms:   Chest discomfort. Most heart attacks involve discomfort in the center of the chest that lasts more than a few minutes, or that goes away and comes back. It can feel like uncomfortable pressure, squeezing, fullness, or pain.  Discomfort in other areas of the upper body. Symptoms can include pain or discomfort in one or both arms, the back, neck, jaw, or stomach.  Shortness of breath with or without chest discomfort.  Other signs may include breaking out in a cold sweat, nausea, or lightheadedness. Don't wait more than five minutes to call 911 - MINUTES MATTER! Fast action can save your life. Calling 911 is almost always the fastest way to get lifesaving treatment. Emergency Medical Services staff can begin treatment when they arrive -- up to an hour sooner than if someone gets to the hospital by car. The discharge information has been reviewed with the patient and spouse. The patient and spouse verbalized understanding. Discharge medications reviewed with the patient and spouse and appropriate educational materials and side effects teaching were provided.   ___________________________________________________________________________________________________________________________________  Patient Discharge Instructions    Ender Kerr / [id number] : 1955    Admitted 2017 Discharged: 2017     Take Home Medications       · It is important that you take the medication exactly as they are prescribed. · Keep your medication in the bottles provided by the pharmacist and keep a list of the medication names, dosages, and times to be taken in your wallet. · Do not take other medications without consulting your doctor. What to do at Home      Recommended activity: No Lifting for 6 weeks. Follow-up with [facility]. Call for an appointment (if not already scheduled)            [phone]. CALL DINESH, MY  TO GET DETAILS REGARDING READMIT ON Thursday. THANKS    Information obtained by :  I understand that if any problems occur once I am at home I am to contact my physician. I understand and acknowledge receipt of the instructions indicated above.                                                                                                                                            Physician's or R.N.'s Signature                                                                  Date/Time                                                                                                                                              Patient or Representative Signature                                                          Date/Time

## 2017-12-19 NOTE — PROGRESS NOTES
Discharge instructions given. Information on prescription given. Lucero cath draining without difficulty. Leg bag teaching done, lucero cath care reviewed. Information on cath care given. Urine color red. Dr. Naylor called and made aware. Pt told to drink plenty of fluids per Dr. Naylor. Pt will return for a procedure Thursday. Question time  Offered.

## 2017-12-21 ENCOUNTER — ANESTHESIA (OUTPATIENT)
Dept: SURGERY | Age: 62
End: 2017-12-21
Payer: COMMERCIAL

## 2017-12-21 ENCOUNTER — HOSPITAL ENCOUNTER (OUTPATIENT)
Age: 62
Setting detail: OBSERVATION
Discharge: HOME | End: 2017-12-24
Attending: UROLOGY | Admitting: UROLOGY
Payer: COMMERCIAL

## 2017-12-21 ENCOUNTER — ANESTHESIA EVENT (OUTPATIENT)
Dept: SURGERY | Age: 62
End: 2017-12-21
Payer: COMMERCIAL

## 2017-12-21 PROBLEM — D49.4 BLADDER TUMOR: Status: ACTIVE | Noted: 2017-12-21

## 2017-12-21 PROCEDURE — C1769 GUIDE WIRE: HCPCS | Performed by: UROLOGY

## 2017-12-21 PROCEDURE — 77030019927 HC TBNG IRR CYSTO BAXT -A

## 2017-12-21 PROCEDURE — 88307 TISSUE EXAM BY PATHOLOGIST: CPT | Performed by: UROLOGY

## 2017-12-21 PROCEDURE — 76210000017 HC OR PH I REC 1.5 TO 2 HR: Performed by: UROLOGY

## 2017-12-21 PROCEDURE — 74011250636 HC RX REV CODE- 250/636: Performed by: UROLOGY

## 2017-12-21 PROCEDURE — 74011250636 HC RX REV CODE- 250/636

## 2017-12-21 PROCEDURE — 77030011640 HC PAD GRND REM COVD -A: Performed by: UROLOGY

## 2017-12-21 PROCEDURE — 77030018830 HC SOL IRR GLYC ICUM-A: Performed by: UROLOGY

## 2017-12-21 PROCEDURE — 77030027138 HC INCENT SPIROMETER -A

## 2017-12-21 PROCEDURE — 77030021707 HC SET IRR FLD WRMR 3M -B: Performed by: UROLOGY

## 2017-12-21 PROCEDURE — 77030018836 HC SOL IRR NACL ICUM -A

## 2017-12-21 PROCEDURE — 74011000258 HC RX REV CODE- 258: Performed by: UROLOGY

## 2017-12-21 PROCEDURE — 74011000250 HC RX REV CODE- 250

## 2017-12-21 PROCEDURE — 74011250637 HC RX REV CODE- 250/637: Performed by: UROLOGY

## 2017-12-21 PROCEDURE — 77030032490 HC SLV COMPR SCD KNE COVD -B: Performed by: UROLOGY

## 2017-12-21 PROCEDURE — 77030010545: Performed by: UROLOGY

## 2017-12-21 PROCEDURE — 76060000033 HC ANESTHESIA 1 TO 1.5 HR: Performed by: UROLOGY

## 2017-12-21 PROCEDURE — 77030011274 HC ELECTRD CUT LP RWOL -F: Performed by: UROLOGY

## 2017-12-21 PROCEDURE — 77030026438 HC STYL ET INTUB CARD -A: Performed by: ANESTHESIOLOGY

## 2017-12-21 PROCEDURE — 77030010509 HC AIRWY LMA MSK TELE -A: Performed by: ANESTHESIOLOGY

## 2017-12-21 PROCEDURE — 74011250636 HC RX REV CODE- 250/636: Performed by: ANESTHESIOLOGY

## 2017-12-21 PROCEDURE — 76010000149 HC OR TIME 1 TO 1.5 HR: Performed by: UROLOGY

## 2017-12-21 PROCEDURE — 99218 HC RM OBSERVATION: CPT

## 2017-12-21 PROCEDURE — 77030018846 HC SOL IRR STRL H20 ICUM -A: Performed by: UROLOGY

## 2017-12-21 PROCEDURE — 77030005546 HC CATH URETH FOL 3W BARD -A: Performed by: UROLOGY

## 2017-12-21 RX ORDER — FENTANYL CITRATE 50 UG/ML
25 INJECTION, SOLUTION INTRAMUSCULAR; INTRAVENOUS
Status: COMPLETED | OUTPATIENT
Start: 2017-12-21 | End: 2017-12-21

## 2017-12-21 RX ORDER — MIDAZOLAM HYDROCHLORIDE 1 MG/ML
1 INJECTION, SOLUTION INTRAMUSCULAR; INTRAVENOUS AS NEEDED
Status: DISCONTINUED | OUTPATIENT
Start: 2017-12-21 | End: 2017-12-21 | Stop reason: HOSPADM

## 2017-12-21 RX ORDER — SODIUM CHLORIDE, SODIUM LACTATE, POTASSIUM CHLORIDE, CALCIUM CHLORIDE 600; 310; 30; 20 MG/100ML; MG/100ML; MG/100ML; MG/100ML
INJECTION, SOLUTION INTRAVENOUS
Status: DISCONTINUED | OUTPATIENT
Start: 2017-12-21 | End: 2017-12-21 | Stop reason: HOSPADM

## 2017-12-21 RX ORDER — CEFAZOLIN SODIUM IN 0.9 % NACL 2 G/100 ML
PLASTIC BAG, INJECTION (ML) INTRAVENOUS AS NEEDED
Status: DISCONTINUED | OUTPATIENT
Start: 2017-12-21 | End: 2017-12-21 | Stop reason: HOSPADM

## 2017-12-21 RX ORDER — FENTANYL CITRATE 50 UG/ML
50 INJECTION, SOLUTION INTRAMUSCULAR; INTRAVENOUS AS NEEDED
Status: DISCONTINUED | OUTPATIENT
Start: 2017-12-21 | End: 2017-12-21 | Stop reason: HOSPADM

## 2017-12-21 RX ORDER — SODIUM CHLORIDE, SODIUM LACTATE, POTASSIUM CHLORIDE, CALCIUM CHLORIDE 600; 310; 30; 20 MG/100ML; MG/100ML; MG/100ML; MG/100ML
25 INJECTION, SOLUTION INTRAVENOUS CONTINUOUS
Status: DISCONTINUED | OUTPATIENT
Start: 2017-12-21 | End: 2017-12-21 | Stop reason: HOSPADM

## 2017-12-21 RX ORDER — NALOXONE HYDROCHLORIDE 0.4 MG/ML
0.4 INJECTION, SOLUTION INTRAMUSCULAR; INTRAVENOUS; SUBCUTANEOUS AS NEEDED
Status: DISCONTINUED | OUTPATIENT
Start: 2017-12-21 | End: 2017-12-24 | Stop reason: HOSPADM

## 2017-12-21 RX ORDER — MIDAZOLAM HYDROCHLORIDE 1 MG/ML
0.5 INJECTION, SOLUTION INTRAMUSCULAR; INTRAVENOUS
Status: DISCONTINUED | OUTPATIENT
Start: 2017-12-21 | End: 2017-12-21 | Stop reason: HOSPADM

## 2017-12-21 RX ORDER — MIDAZOLAM HYDROCHLORIDE 1 MG/ML
INJECTION, SOLUTION INTRAMUSCULAR; INTRAVENOUS AS NEEDED
Status: DISCONTINUED | OUTPATIENT
Start: 2017-12-21 | End: 2017-12-21 | Stop reason: HOSPADM

## 2017-12-21 RX ORDER — ATENOLOL 25 MG/1
25 TABLET ORAL DAILY
Status: DISCONTINUED | OUTPATIENT
Start: 2017-12-22 | End: 2017-12-24 | Stop reason: HOSPADM

## 2017-12-21 RX ORDER — SODIUM CHLORIDE 9 MG/ML
25 INJECTION, SOLUTION INTRAVENOUS CONTINUOUS
Status: DISCONTINUED | OUTPATIENT
Start: 2017-12-21 | End: 2017-12-21 | Stop reason: HOSPADM

## 2017-12-21 RX ORDER — OXYBUTYNIN CHLORIDE 5 MG/1
5 TABLET ORAL 3 TIMES DAILY
Status: DISCONTINUED | OUTPATIENT
Start: 2017-12-21 | End: 2017-12-24 | Stop reason: HOSPADM

## 2017-12-21 RX ORDER — CEPHALEXIN 500 MG/1
500 CAPSULE ORAL 4 TIMES DAILY
Status: DISCONTINUED | OUTPATIENT
Start: 2017-12-21 | End: 2017-12-22

## 2017-12-21 RX ORDER — FENTANYL CITRATE 50 UG/ML
INJECTION, SOLUTION INTRAMUSCULAR; INTRAVENOUS AS NEEDED
Status: DISCONTINUED | OUTPATIENT
Start: 2017-12-21 | End: 2017-12-21 | Stop reason: HOSPADM

## 2017-12-21 RX ORDER — ACETAMINOPHEN 10 MG/ML
1000 INJECTION, SOLUTION INTRAVENOUS EVERY 6 HOURS
Status: COMPLETED | OUTPATIENT
Start: 2017-12-21 | End: 2017-12-22

## 2017-12-21 RX ORDER — PROPOFOL 10 MG/ML
INJECTION, EMULSION INTRAVENOUS AS NEEDED
Status: DISCONTINUED | OUTPATIENT
Start: 2017-12-21 | End: 2017-12-21 | Stop reason: HOSPADM

## 2017-12-21 RX ORDER — SODIUM CHLORIDE 0.9 % (FLUSH) 0.9 %
5-10 SYRINGE (ML) INJECTION AS NEEDED
Status: DISCONTINUED | OUTPATIENT
Start: 2017-12-21 | End: 2017-12-21 | Stop reason: HOSPADM

## 2017-12-21 RX ORDER — OXYCODONE HYDROCHLORIDE 5 MG/1
5 TABLET ORAL AS NEEDED
Status: DISCONTINUED | OUTPATIENT
Start: 2017-12-21 | End: 2017-12-21 | Stop reason: HOSPADM

## 2017-12-21 RX ORDER — ROPIVACAINE HYDROCHLORIDE 5 MG/ML
30 INJECTION, SOLUTION EPIDURAL; INFILTRATION; PERINEURAL AS NEEDED
Status: DISCONTINUED | OUTPATIENT
Start: 2017-12-21 | End: 2017-12-21 | Stop reason: HOSPADM

## 2017-12-21 RX ORDER — DEXTROSE MONOHYDRATE AND SODIUM CHLORIDE 5; .45 G/100ML; G/100ML
100 INJECTION, SOLUTION INTRAVENOUS CONTINUOUS
Status: DISPENSED | OUTPATIENT
Start: 2017-12-21 | End: 2017-12-22

## 2017-12-21 RX ORDER — DIPHENHYDRAMINE HYDROCHLORIDE 50 MG/ML
12.5 INJECTION, SOLUTION INTRAMUSCULAR; INTRAVENOUS AS NEEDED
Status: DISCONTINUED | OUTPATIENT
Start: 2017-12-21 | End: 2017-12-21 | Stop reason: HOSPADM

## 2017-12-21 RX ORDER — SODIUM CHLORIDE 0.9 % (FLUSH) 0.9 %
5-10 SYRINGE (ML) INJECTION EVERY 8 HOURS
Status: DISCONTINUED | OUTPATIENT
Start: 2017-12-21 | End: 2017-12-21 | Stop reason: HOSPADM

## 2017-12-21 RX ORDER — SODIUM CHLORIDE 0.9 % (FLUSH) 0.9 %
5-10 SYRINGE (ML) INJECTION AS NEEDED
Status: DISCONTINUED | OUTPATIENT
Start: 2017-12-21 | End: 2017-12-24 | Stop reason: HOSPADM

## 2017-12-21 RX ORDER — LIDOCAINE HYDROCHLORIDE 20 MG/ML
INJECTION, SOLUTION EPIDURAL; INFILTRATION; INTRACAUDAL; PERINEURAL AS NEEDED
Status: DISCONTINUED | OUTPATIENT
Start: 2017-12-21 | End: 2017-12-21 | Stop reason: HOSPADM

## 2017-12-21 RX ORDER — LIDOCAINE HYDROCHLORIDE 10 MG/ML
0.1 INJECTION, SOLUTION EPIDURAL; INFILTRATION; INTRACAUDAL; PERINEURAL AS NEEDED
Status: DISCONTINUED | OUTPATIENT
Start: 2017-12-21 | End: 2017-12-21 | Stop reason: HOSPADM

## 2017-12-21 RX ORDER — HYDROCODONE BITARTRATE AND ACETAMINOPHEN 5; 325 MG/1; MG/1
1 TABLET ORAL
Status: DISCONTINUED | OUTPATIENT
Start: 2017-12-21 | End: 2017-12-22

## 2017-12-21 RX ORDER — ONDANSETRON 2 MG/ML
4 INJECTION INTRAMUSCULAR; INTRAVENOUS AS NEEDED
Status: DISCONTINUED | OUTPATIENT
Start: 2017-12-21 | End: 2017-12-21 | Stop reason: HOSPADM

## 2017-12-21 RX ORDER — ONDANSETRON 2 MG/ML
INJECTION INTRAMUSCULAR; INTRAVENOUS AS NEEDED
Status: DISCONTINUED | OUTPATIENT
Start: 2017-12-21 | End: 2017-12-21 | Stop reason: HOSPADM

## 2017-12-21 RX ORDER — MORPHINE SULFATE 2 MG/ML
1 INJECTION, SOLUTION INTRAMUSCULAR; INTRAVENOUS
Status: DISCONTINUED | OUTPATIENT
Start: 2017-12-21 | End: 2017-12-24 | Stop reason: HOSPADM

## 2017-12-21 RX ORDER — GLYCOPYRROLATE 0.2 MG/ML
INJECTION INTRAMUSCULAR; INTRAVENOUS AS NEEDED
Status: DISCONTINUED | OUTPATIENT
Start: 2017-12-21 | End: 2017-12-21 | Stop reason: HOSPADM

## 2017-12-21 RX ORDER — MORPHINE SULFATE 10 MG/ML
2 INJECTION, SOLUTION INTRAMUSCULAR; INTRAVENOUS
Status: DISCONTINUED | OUTPATIENT
Start: 2017-12-21 | End: 2017-12-21 | Stop reason: HOSPADM

## 2017-12-21 RX ORDER — BACITRACIN ZINC 500 UNIT/G
OINTMENT (GRAM) TOPICAL 3 TIMES DAILY
Status: DISCONTINUED | OUTPATIENT
Start: 2017-12-21 | End: 2017-12-21 | Stop reason: SDUPTHER

## 2017-12-21 RX ORDER — BACITRACIN 500 [USP'U]/G
OINTMENT TOPICAL 3 TIMES DAILY
Status: DISCONTINUED | OUTPATIENT
Start: 2017-12-21 | End: 2017-12-24 | Stop reason: HOSPADM

## 2017-12-21 RX ORDER — SODIUM CHLORIDE, SODIUM LACTATE, POTASSIUM CHLORIDE, CALCIUM CHLORIDE 600; 310; 30; 20 MG/100ML; MG/100ML; MG/100ML; MG/100ML
100 INJECTION, SOLUTION INTRAVENOUS CONTINUOUS
Status: DISCONTINUED | OUTPATIENT
Start: 2017-12-21 | End: 2017-12-21 | Stop reason: HOSPADM

## 2017-12-21 RX ORDER — SODIUM CHLORIDE 0.9 % (FLUSH) 0.9 %
5-10 SYRINGE (ML) INJECTION EVERY 8 HOURS
Status: DISCONTINUED | OUTPATIENT
Start: 2017-12-21 | End: 2017-12-24 | Stop reason: HOSPADM

## 2017-12-21 RX ADMIN — BACITRACIN: 500 OINTMENT TOPICAL at 22:14

## 2017-12-21 RX ADMIN — FENTANYL CITRATE 25 MCG: 50 INJECTION, SOLUTION INTRAMUSCULAR; INTRAVENOUS at 10:10

## 2017-12-21 RX ADMIN — OXYBUTYNIN CHLORIDE 5 MG: 5 TABLET ORAL at 16:15

## 2017-12-21 RX ADMIN — ACETAMINOPHEN 1000 MG: 10 INJECTION, SOLUTION INTRAVENOUS at 12:46

## 2017-12-21 RX ADMIN — OXYBUTYNIN CHLORIDE 5 MG: 5 TABLET ORAL at 22:13

## 2017-12-21 RX ADMIN — ONDANSETRON 4 MG: 2 INJECTION INTRAMUSCULAR; INTRAVENOUS at 08:10

## 2017-12-21 RX ADMIN — FENTANYL CITRATE 50 MCG: 50 INJECTION, SOLUTION INTRAMUSCULAR; INTRAVENOUS at 08:15

## 2017-12-21 RX ADMIN — FENTANYL CITRATE 25 MCG: 50 INJECTION, SOLUTION INTRAMUSCULAR; INTRAVENOUS at 09:55

## 2017-12-21 RX ADMIN — FENTANYL CITRATE 50 MCG: 50 INJECTION, SOLUTION INTRAMUSCULAR; INTRAVENOUS at 08:10

## 2017-12-21 RX ADMIN — MORPHINE SULFATE 2 MG: 10 INJECTION, SOLUTION INTRAMUSCULAR; INTRAVENOUS at 10:15

## 2017-12-21 RX ADMIN — DEXTROSE MONOHYDRATE AND SODIUM CHLORIDE 100 ML/HR: 5; .45 INJECTION, SOLUTION INTRAVENOUS at 10:32

## 2017-12-21 RX ADMIN — LIDOCAINE HYDROCHLORIDE 60 MG: 20 INJECTION, SOLUTION EPIDURAL; INFILTRATION; INTRACAUDAL; PERINEURAL at 08:19

## 2017-12-21 RX ADMIN — SODIUM CHLORIDE, SODIUM LACTATE, POTASSIUM CHLORIDE, AND CALCIUM CHLORIDE 25 ML/HR: 600; 310; 30; 20 INJECTION, SOLUTION INTRAVENOUS at 07:42

## 2017-12-21 RX ADMIN — HYDROCODONE BITARTRATE AND ACETAMINOPHEN 1 TABLET: 5; 325 TABLET ORAL at 15:20

## 2017-12-21 RX ADMIN — MORPHINE SULFATE 2 MG: 10 INJECTION, SOLUTION INTRAMUSCULAR; INTRAVENOUS at 10:25

## 2017-12-21 RX ADMIN — CEPHALEXIN 500 MG: 500 CAPSULE ORAL at 18:27

## 2017-12-21 RX ADMIN — PROPOFOL 170 MG: 10 INJECTION, EMULSION INTRAVENOUS at 08:19

## 2017-12-21 RX ADMIN — ONDANSETRON 4 MG: 2 INJECTION INTRAMUSCULAR; INTRAVENOUS at 09:15

## 2017-12-21 RX ADMIN — SODIUM CHLORIDE, SODIUM LACTATE, POTASSIUM CHLORIDE, CALCIUM CHLORIDE: 600; 310; 30; 20 INJECTION, SOLUTION INTRAVENOUS at 08:05

## 2017-12-21 RX ADMIN — Medication 2 G: at 08:15

## 2017-12-21 RX ADMIN — FENTANYL CITRATE 25 MCG: 50 INJECTION, SOLUTION INTRAMUSCULAR; INTRAVENOUS at 10:00

## 2017-12-21 RX ADMIN — ACETAMINOPHEN 1000 MG: 10 INJECTION, SOLUTION INTRAVENOUS at 18:27

## 2017-12-21 RX ADMIN — FENTANYL CITRATE 25 MCG: 50 INJECTION, SOLUTION INTRAMUSCULAR; INTRAVENOUS at 10:05

## 2017-12-21 RX ADMIN — MIDAZOLAM 0.5 MG: 1 INJECTION INTRAMUSCULAR; INTRAVENOUS at 10:30

## 2017-12-21 RX ADMIN — MIDAZOLAM HYDROCHLORIDE 2 MG: 1 INJECTION, SOLUTION INTRAMUSCULAR; INTRAVENOUS at 08:10

## 2017-12-21 RX ADMIN — DEXTROSE MONOHYDRATE AND SODIUM CHLORIDE 100 ML/HR: 5; .45 INJECTION, SOLUTION INTRAVENOUS at 18:28

## 2017-12-21 RX ADMIN — FENTANYL CITRATE 50 MCG: 50 INJECTION, SOLUTION INTRAMUSCULAR; INTRAVENOUS at 08:30

## 2017-12-21 RX ADMIN — OXYBUTYNIN CHLORIDE 5 MG: 5 TABLET ORAL at 10:59

## 2017-12-21 RX ADMIN — HYDROCODONE BITARTRATE AND ACETAMINOPHEN 1 TABLET: 5; 325 TABLET ORAL at 20:43

## 2017-12-21 RX ADMIN — GLYCOPYRROLATE 0.2 MG: 0.2 INJECTION INTRAMUSCULAR; INTRAVENOUS at 08:10

## 2017-12-21 RX ADMIN — MORPHINE SULFATE 2 MG: 10 INJECTION, SOLUTION INTRAMUSCULAR; INTRAVENOUS at 10:40

## 2017-12-21 RX ADMIN — MORPHINE SULFATE 1 MG: 2 INJECTION, SOLUTION INTRAMUSCULAR; INTRAVENOUS at 22:42

## 2017-12-21 RX ADMIN — BACITRACIN: 500 OINTMENT TOPICAL at 15:20

## 2017-12-21 RX ADMIN — MORPHINE SULFATE 2 MG: 10 INJECTION, SOLUTION INTRAMUSCULAR; INTRAVENOUS at 10:20

## 2017-12-21 RX ADMIN — CEPHALEXIN 500 MG: 500 CAPSULE ORAL at 22:13

## 2017-12-21 RX ADMIN — MORPHINE SULFATE 2 MG: 10 INJECTION, SOLUTION INTRAMUSCULAR; INTRAVENOUS at 10:35

## 2017-12-21 RX ADMIN — MIDAZOLAM 0.5 MG: 1 INJECTION INTRAMUSCULAR; INTRAVENOUS at 10:10

## 2017-12-21 NOTE — ANESTHESIA POSTPROCEDURE EVALUATION
Post-Anesthesia Evaluation and Assessment    Patient: Dalton Panchal MRN: [id number]  SSN: [id number]    YOB: 1955  Age: 60 y.o. Sex: male       Cardiovascular Function/Vital Signs  Visit Vitals    /77    Pulse 63    Temp 37 °C (98.6 °F)    Resp 11    Ht 5' 8\" (1.727 m)    Wt 88.9 kg (196 lb)    SpO2 100%    BMI 29.8 kg/m2       Patient is status post general anesthesia for Procedure(s):  CYSTOSCOPY, URETHERAL DILITATION, TRANSURETHRAL RESECTION OF BLADDER TUMOR . Nausea/Vomiting: None    Postoperative hydration reviewed and adequate. Pain:  Pain Scale 1: Numeric (0 - 10) (12/21/17 0955)  Pain Intensity 1: 9 (12/21/17 0955)   Managed    Neurological Status:   Neuro (WDL): Within Defined Limits (12/21/17 0955)  Neuro  Neurologic State: Drowsy (12/21/17 0955)  Orientation Level: Oriented X4 (12/21/17 0955)  LUE Motor Response: Purposeful (12/21/17 0948)  LLE Motor Response: Purposeful (12/21/17 0948)  RUE Motor Response: Purposeful (12/21/17 0948)  RLE Motor Response: Purposeful (12/21/17 0948)   At baseline    Mental Status and Level of Consciousness: Arousable    Pulmonary Status:   O2 Device: CO2 nasal cannula (12/21/17 1030)   Adequate oxygenation and airway patent    Complications related to anesthesia: None    Post-anesthesia assessment completed.  No concerns    Signed By: Any Ha MD     December 21, 2017

## 2017-12-21 NOTE — PERIOP NOTES
Spoke with patients wife Hugh Chatterjee in surgical waiting area and updated her on start of procedure. Surgeon aware.

## 2017-12-21 NOTE — PERIOP NOTES
TRANSFER - OUT REPORT:    Verbal report given to [address] on [facility]  being transferred to [phone] for routine post - op       Report consisted of patients Situation, Background, Assessment and   Recommendations(SBAR). Time Pre op antibiotic [id number]  Anesthesia Stop time: 5699  Pack Present on Transfer to floor:yes  Order for Pack on Chart:yes    Information from the following report(s) SBAR, OR Summary, Intake/Output and MAR was reviewed with the receiving nurse. Opportunity for questions and clarification was provided. Is the patient on 02? YES       L/Min 2       Other via NC    Is the patient on a monitor? NO    Is the nurse transporting with the patient? NO    Surgical Waiting Area notified of patient's transfer from PACU? YES      The following personal items collected during your admission accompanied patient upon transfer:   Dental Appliance: Dental Appliances: None  Vision: Visual Aid: Glasses, With patient  Hearing Aid:    Jewelry: Jewelry: None  Clothing: Clothing: At bedside, With patient (in PACU)  Other Valuables:  Other Valuables: Eyeglasses, With patient (in PACU)  Valuables sent to safe:

## 2017-12-21 NOTE — PERIOP NOTES
Patient placed in lithotomy bilateral legs supported in yellow fin stirrups. Arms placed at sides, hands and IV sites remained free from compression. Surgeon assisted and approved final patient position. Proper body alignment maintained during procedure. Warmed sterile glycine used during procedure. KY and betadine on back table.

## 2017-12-21 NOTE — PROGRESS NOTES
Pt with Glycine CBI, pt c/o 8/10 lower abdominal pain, urine appears to be draining but not much in tubing. Hand irrigated with sterile saline. Obtained one small clot. Irrigated x 3, urine completely clear, draining. Received new order to change CBI fluid to Normal Saline, fluid changed.

## 2017-12-21 NOTE — BRIEF OP NOTE
BRIEF OPERATIVE NOTE    Date of Procedure: 12/21/2017   Preoperative Diagnosis: BLADDER MASS  Postoperative Diagnosis: BLADDER MASS    Procedure(s):  CYSTOSCOPY, URETHERAL DILITATION, TRANSURETHRAL RESECTION OF BLADDER TUMOR   Surgeon(s) and Role:     * Soni Gabriel MD - Primary         Assistant Staff:       Surgical Staff:  Circ-1: Sarah Wilson RN  Scrub RN-1: David Galdamez RN  Event Time In   Incision Start 0827   Incision Close 0914     Anesthesia: General   Estimated Blood Loss: 100  Specimens:   ID Type Source Tests Collected by Time Destination   1 : bladder tumor large Fresh Bladder  Soni Gabriel MD 12/21/2017 0902 Pathology      Findings: very tight urethra.   Large bladder tumor right lat wall resected   Complications: 0  Implants: * No implants in log *

## 2017-12-21 NOTE — ROUTINE PROCESS
Patient: Viktoria Pineda MRN: [id number]  SSN: [id number]   YOB: 1955  Age: 60 y.o. Sex: male     Patient is status post Procedure(s):  CYSTOSCOPY, URETHERAL DILITATION, TRANSURETHRAL RESECTION OF BLADDER TUMOR .     Surgeon(s) and Role:     * Jun Reynoso MD - Primary    Local/Dose/Irrigation: na                  Peripheral IV 12/21/17 Left Hand (Active)   Dressing Status New 12/21/2017  7:46 AM   Dressing Type Transparent 12/21/2017  7:46 AM   Hub Color/Line Status Infusing 12/21/2017  7:46 AM            Airway - Endotracheal Tube Oral (Active)                   Dressing/Packing:  Wound Penis-DRESSING TYPE:  (no dressing) (12/21/17 0900)  Splint/Cast:  ]    Other:  22 Vincentian lucero in place, secured to thigh with tension with continuous bladder irrigation in place

## 2017-12-21 NOTE — ANESTHESIA PREPROCEDURE EVALUATION
Anesthetic History   No history of anesthetic complications            Review of Systems / Medical History  Patient summary reviewed, nursing notes reviewed and pertinent labs reviewed    Pulmonary  Within defined limits                 Neuro/Psych   Within defined limits           Cardiovascular    Hypertension        Dysrhythmias : SVT  Hyperlipidemia    Exercise tolerance: >4 METS     GI/Hepatic/Renal  Within defined limits              Endo/Other  Within defined limits           Other Findings   Comments: Bladder mass           Physical Exam    Airway  Mallampati: II  TM Distance: 4 - 6 cm  Neck ROM: normal range of motion   Mouth opening: Normal     Cardiovascular    Rhythm: regular  Rate: normal         Dental    Dentition: Lower dentition intact and Upper dentition intact     Pulmonary  Breath sounds clear to auscultation               Abdominal  GI exam deferred       Other Findings            Anesthetic Plan    ASA: 2  Anesthesia type: general          Induction: Intravenous  Anesthetic plan and risks discussed with: Patient

## 2017-12-21 NOTE — PERIOP NOTES
# 22 Romanian 3 way cath secured to thigh with tape placing tension on lucero per Dr. Michelle Sanchez. Continuous bladder irrigation going.

## 2017-12-21 NOTE — IP AVS SNAPSHOT
[address] [address] 
[phone] Patient: Román Hand MRN: [id number] KZQ:[birth year]24 My Medications STOP taking these medications   
 aspirin delayed-release 81 mg tablet  
   
  
 cephALEXin 500 mg capsule Commonly known as:  Anne-Marie Desanctis TAKE these medications as instructed Instructions Each Dose to Equal  
 Morning Noon Evening Bedtime  
 amoxicillin-clavulanate 875-125 mg per tablet Commonly known as:  AUGMENTIN Your last dose was: Your next dose is: Take 1 Tab by mouth every twelve (12) hours for 7 days. Indications: E. COLI URINARY TRACT INFECTION  
 1 Tab  
    
   
   
   
  
 atorvastatin 20 mg tablet Commonly known as:  LIPITOR Your last dose was: Your next dose is: Take 20 mg by mouth daily. 20 mg  
    
   
   
   
  
 finasteride 5 mg tablet Commonly known as:  PROSCAR Your last dose was: Your next dose is: Take 5 mg by mouth nightly. 5 mg FLOMAX 0.4 mg capsule Generic drug:  tamsulosin Your last dose was: Your next dose is: Take 0.4 mg by mouth nightly. 0.4 mg HYDROcodone-acetaminophen 5-325 mg per tablet Commonly known as:  Tiki Dilly Your last dose was: Your next dose is: Take 1 Tab by mouth every six (6) hours as needed for Pain. Max Daily Amount: 4 Tabs. Indications: Pain 1 Tab TENORMIN 25 mg tablet Generic drug:  atenolol Your last dose was: Your next dose is: Take 25 mg by mouth daily. 25 mg Where to Get Your Medications Information on where to get these meds will be given to you by the nurse or doctor. ! Ask your nurse or doctor about these medications  
  amoxicillin-clavulanate 875-125 mg per tablet

## 2017-12-21 NOTE — H&P
Urology History and Physical    Patient: Lani Diez 60 y.o. male     Referring Physician:  No ref. provider found    Chief Complaint: No chief complaint on file. History of Present Illness: has bladder tumor and unable to access w resectascope recently due to urethral size. For redilation and turbt. History:    Past Medical History:   Diagnosis Date    Bladder cancer ([facility] [address] 12/2017    Cancer of skin of left leg     WPW (Nancy-Parkinson-White syndrome) 1997     Family History   Problem Relation Age of Onset    Diabetes Mother     Cancer Mother      BILE DUCT?  Heart Disease Father     Cancer Sister      PANCREATIC    No Known Problems Brother     Other Daughter      ITP    Anesth Problems Neg Hx      Social History     Social History    Marital status:      Spouse name: N/A    Number of children: N/A    Years of education: N/A     Occupational History    Not on file. Social History Main Topics    Smoking status: Former Smoker     Packs/day: 0.50     Years: 10.00     Quit date: 12/13/1986    Smokeless tobacco: Never Used    Alcohol use Yes      Comment: 5-7 DRINKS PER WEEK    Drug use: No    Sexual activity: Not on file     Other Topics Concern    Not on file     Social History Narrative       Allergies: No Known Allergies    Current Medications:  No current facility-administered medications for this encounter. Physical Exam:  Height 5' 8\" (1.727 m), weight 88.9 kg (196 lb). GENERAL: alert, cooperative, no distress, appears stated age, LUNG: clear to auscultation bilaterally, HEART: regular rate and rhythm, S1, S2 normal, no murmur, click, rub or gallop, ABDOMEN: soft, non-tender.  Bowel sounds normal. No masses,  no organomegaly, EXTREMITIES:  extremities normal, atraumatic, no cyanosis or edema    Findings/Diagnosis: bladder tumor    Alerts:    Hospital Problems  Date Reviewed: 12/12/2017    None          Laboratory:      Recent Labs      12/19/17   9538 12/18/17   1135   HGB  12.6   --    BUN   --   21*   CREA   --   1.34*   K   --   4.4         Plan of Care/Planned Procedure:  Risks, benefits, and alternatives reviewed with patient and he agrees to proceed with the procedure. Full dictated report to follow.

## 2017-12-21 NOTE — IP AVS SNAPSHOT
[address] [facility] [address] [place] [address] 
[phone] Patient: Verónica Forbes MRN: [id number] MRB:[birth year]7195 About your hospitalization You were admitted on:  December 21, 2017 You last received care in the:  [address] [place] [address] You were discharged on:  December 24, 2017 Why you were hospitalized Your primary diagnosis was:  Not on File Your diagnoses also included:  Bladder Tumor Things You Need To Do (next 8 weeks) Follow up with Shante Zuniga MD  
  
Phone:  [phone] Where:  [address] [facility], [address] [facility] Discharge Orders None A check jayden indicates which time of day the medication should be taken. My Medications STOP taking these medications   
 aspirin delayed-release 81 mg tablet  
   
  
 cephALEXin 500 mg capsule Commonly known as:  Bibiana Frausto TAKE these medications as instructed Instructions Each Dose to Equal  
 Morning Noon Evening Bedtime  
 amoxicillin-clavulanate 875-125 mg per tablet Commonly known as:  AUGMENTIN Your last dose was: Your next dose is: Take 1 Tab by mouth every twelve (12) hours for 7 days. Indications: E. COLI URINARY TRACT INFECTION  
 1 Tab  
    
   
   
   
  
 atorvastatin 20 mg tablet Commonly known as:  LIPITOR Your last dose was: Your next dose is: Take 20 mg by mouth daily. 20 mg  
    
   
   
   
  
 finasteride 5 mg tablet Commonly known as:  PROSCAR Your last dose was: Your next dose is: Take 5 mg by mouth nightly. 5 mg FLOMAX 0.4 mg capsule Generic drug:  tamsulosin Your last dose was: Your next dose is: Take 0.4 mg by mouth nightly. 0.4 mg HYDROcodone-acetaminophen 5-325 mg per tablet Commonly known as:  Radonna [place] Your last dose was: Your next dose is: Take 1 Tab by mouth every six (6) hours as needed for Pain. Max Daily Amount: 4 Tabs. Indications: Pain 1 Tab TENORMIN 25 mg tablet Generic drug:  atenolol Your last dose was: Your next dose is: Take 25 mg by mouth daily. 25 mg Where to Get Your Medications Information on where to get these meds will be given to you by the nurse or doctor. ! Ask your nurse or doctor about these medications  
  amoxicillin-clavulanate 875-125 mg per tablet Discharge Instructions Urology Discharge Instructions Patient ID: Yesica Katz [id number] 
60 y.o. 
1955 Admit Date: 12/21/2017 Discharge Date: 12/24/2017 Admission Diagnoses:  BLADDER MASS Bladder tumor Discharge Diagnoses: same, urethral stricture Procedures for this admission: Procedure(s): 
CYSTOSCOPY, URETHERAL DILITATION, TRANSURETHRAL RESECTION OF BLADDER TUMOR Disposition: home Discharged Condition: good Patient Instructions:  
Call for temperature over 100.5 degrees F. Take a probiotic (like over-the-counter Culturelle) daily while on antibiotics. Drink plenty of water. Activity: no lifting or strenuous exercise for 3 weeks. Follow-up with Dr. Vineet Royal as scheduled. Signed: 
Dennis Morales MD 
12/24/2017 
2:01 PM 
 
 
 
  
  
  
Introducing [facility]! [facility] introduces [employer] patient portal. Now you can access parts of your medical record, email your doctor's office, and request medication refills online. 1. In your internet browser, go to [email or web address] 2. Click on the First Time User? Click Here link in the Sign In box. You will see the New Member Sign Up page. 3. Enter your [employer] Access Code exactly as it appears below. You will not need to use this code after youve completed the sign-up process.  If you do not sign up before the expiration date, you must request a new code. · Angella [employer] Access Code: [email or web address] Expires: 3/12/2018 10:19 AM 
 
4. Enter the last four digits of your Social Security Number (xxxx) and Date of Birth (mm/dd/yyyy) as indicated and click Submit. You will be taken to the next sign-up page. 5. Create a Angella Joy ID. This will be your [employer] login ID and cannot be changed, so think of one that is secure and easy to remember. 6. Create a [employer] password. You can change your password at any time. 7. Enter your Password Reset Question and Answer. This can be used at a later time if you forget your password. 8. Enter your e-mail address. You will receive e-mail notification when new information is available in [address] 9. Click Sign Up. You can now view and download portions of your medical record. 10. Click the Download Summary menu link to download a portable copy of your medical information. If you have questions, please visit the Frequently Asked Questions section of the [employer]ella [employer] website. Remember, [employer] is NOT to be used for urgent needs. For medical emergencies, dial 911. Now available from your iPhone and Android! Providers Seen During Your Hospitalization Provider Specialty Primary office phone Sharon Damon MD Urology [phone] Your Primary Care Physician (PCP) Primary Care Physician Office Phone Office Fax Annmarie Gracia [phone] You are allergic to the following No active allergies Recent Documentation Height Weight BMI Smoking Status 1.727 m 88.9 kg 29.8 kg/m2 Former Smoker Emergency Contacts Name Discharge Info Relation Home Work Mobile [facility] DISCHARGE CAREGIVER [3] Spouse [3] [phone] Patient Belongings The following personal items are in your possession at time of discharge: Dental Appliances: None  Visual Aid: Glasses, With patient      Home Medications: None   Jewelry: None  Clothing: At bedside, With patient (in PACU)    Other Valuables: Eyeglasses, With patient (in PACU) Please provide this summary of care documentation to your next provider. Signatures-by signing, you are acknowledging that this After Visit Summary has been reviewed with you and you have received a copy. Patient Signature:  ____________________________________________________________ Date:  ____________________________________________________________  
  
[facility] Provider Signature:  ____________________________________________________________ Date:  ____________________________________________________________

## 2017-12-22 LAB
ANION GAP SERPL CALC-SCNC: 6 MMOL/L (ref 5–15)
BUN SERPL-MCNC: 12 MG/DL (ref 6–20)
BUN/CREAT SERPL: 11 (ref 12–20)
CALCIUM SERPL-MCNC: 9.1 MG/DL (ref 8.5–10.1)
CHLORIDE SERPL-SCNC: 100 MMOL/L (ref 97–108)
CO2 SERPL-SCNC: 29 MMOL/L (ref 21–32)
CREAT SERPL-MCNC: 1.13 MG/DL (ref 0.7–1.3)
GLUCOSE SERPL-MCNC: 124 MG/DL (ref 65–100)
HGB BLD-MCNC: 12.3 G/DL (ref 12.1–17)
POTASSIUM SERPL-SCNC: 3.8 MMOL/L (ref 3.5–5.1)
SODIUM SERPL-SCNC: 135 MMOL/L (ref 136–145)

## 2017-12-22 PROCEDURE — 99218 HC RM OBSERVATION: CPT

## 2017-12-22 PROCEDURE — 74011250636 HC RX REV CODE- 250/636: Performed by: UROLOGY

## 2017-12-22 PROCEDURE — 74011250637 HC RX REV CODE- 250/637: Performed by: UROLOGY

## 2017-12-22 PROCEDURE — 36415 COLL VENOUS BLD VENIPUNCTURE: CPT | Performed by: UROLOGY

## 2017-12-22 PROCEDURE — 87086 URINE CULTURE/COLONY COUNT: CPT | Performed by: UROLOGY

## 2017-12-22 PROCEDURE — 80048 BASIC METABOLIC PNL TOTAL CA: CPT | Performed by: UROLOGY

## 2017-12-22 PROCEDURE — 85018 HEMOGLOBIN: CPT | Performed by: UROLOGY

## 2017-12-22 RX ORDER — DOCUSATE SODIUM 100 MG/1
100 CAPSULE, LIQUID FILLED ORAL DAILY
Status: DISCONTINUED | OUTPATIENT
Start: 2017-12-22 | End: 2017-12-24 | Stop reason: HOSPADM

## 2017-12-22 RX ORDER — MORPHINE SULFATE 2 MG/ML
1 INJECTION, SOLUTION INTRAMUSCULAR; INTRAVENOUS
Status: DISCONTINUED | OUTPATIENT
Start: 2017-12-22 | End: 2017-12-22 | Stop reason: SDUPTHER

## 2017-12-22 RX ORDER — HYDROCODONE BITARTRATE AND ACETAMINOPHEN 5; 325 MG/1; MG/1
1-2 TABLET ORAL
Status: DISCONTINUED | OUTPATIENT
Start: 2017-12-22 | End: 2017-12-24 | Stop reason: HOSPADM

## 2017-12-22 RX ADMIN — OXYBUTYNIN CHLORIDE 5 MG: 5 TABLET ORAL at 08:13

## 2017-12-22 RX ADMIN — ATENOLOL 25 MG: 25 TABLET ORAL at 08:13

## 2017-12-22 RX ADMIN — OXYBUTYNIN CHLORIDE 5 MG: 5 TABLET ORAL at 21:02

## 2017-12-22 RX ADMIN — MORPHINE SULFATE 1 MG: 2 INJECTION, SOLUTION INTRAMUSCULAR; INTRAVENOUS at 00:43

## 2017-12-22 RX ADMIN — BACITRACIN: 500 OINTMENT TOPICAL at 15:28

## 2017-12-22 RX ADMIN — HYDROCODONE BITARTRATE AND ACETAMINOPHEN 2 TABLET: 5; 325 TABLET ORAL at 15:28

## 2017-12-22 RX ADMIN — BACITRACIN: 500 OINTMENT TOPICAL at 08:14

## 2017-12-22 RX ADMIN — Medication 10 ML: at 21:02

## 2017-12-22 RX ADMIN — HYDROCODONE BITARTRATE AND ACETAMINOPHEN 1 TABLET: 5; 325 TABLET ORAL at 21:43

## 2017-12-22 RX ADMIN — MORPHINE SULFATE 1 MG: 2 INJECTION, SOLUTION INTRAMUSCULAR; INTRAVENOUS at 16:28

## 2017-12-22 RX ADMIN — PIPERACILLIN AND TAZOBACTAM 10.12 G: 3; .375 INJECTION, POWDER, FOR SOLUTION INTRAVENOUS at 08:23

## 2017-12-22 RX ADMIN — ACETAMINOPHEN 1000 MG: 10 INJECTION, SOLUTION INTRAVENOUS at 00:42

## 2017-12-22 RX ADMIN — OXYBUTYNIN CHLORIDE 5 MG: 5 TABLET ORAL at 15:28

## 2017-12-22 RX ADMIN — BACITRACIN: 500 OINTMENT TOPICAL at 21:02

## 2017-12-22 RX ADMIN — ACETAMINOPHEN 1000 MG: 10 INJECTION, SOLUTION INTRAVENOUS at 07:00

## 2017-12-22 RX ADMIN — HYDROCODONE BITARTRATE AND ACETAMINOPHEN 2 TABLET: 5; 325 TABLET ORAL at 09:49

## 2017-12-22 RX ADMIN — PIPERACILLIN SODIUM AND TAZOBACTAM SODIUM 3.38 G: .375; 3 INJECTION, POWDER, LYOPHILIZED, FOR SOLUTION INTRAVENOUS at 08:23

## 2017-12-22 RX ADMIN — HYDROCODONE BITARTRATE AND ACETAMINOPHEN 1 TABLET: 5; 325 TABLET ORAL at 06:00

## 2017-12-22 RX ADMIN — DOCUSATE SODIUM 100 MG: 100 CAPSULE, LIQUID FILLED ORAL at 08:13

## 2017-12-22 NOTE — PROGRESS NOTES
Pt c/o pain level 8 after iv tylenol and norco -Dr Randy Qureshi called morphine 1 mg given -pt states not much relief but is more relaxed- urine clear- CBI dripping slowly

## 2017-12-22 NOTE — OP NOTES
[address] [facility] OP NOTE    Ninfa Kennedy  MR#: [id number]  : 1955  ACCOUNT #: [de-identified]   DATE OF SERVICE: 2017    DATE OF PROCEDURE:  2017    PREOPERATIVE DIAGNOSIS:  Large bladder tumor, urethral stricture. COMPLICATIONS:  None. ESTIMATED BLOOD LOSS:  100 mL     SPECIMENS:  Bladder tumor, large. PROCEDURE:  Urethral dilation, transurethral resection bladder tumor. ANESTHESIA:  General.    PROCEDURE:  After anesthesia, the patient was prepped and draped in lithotomy. A wire was passed through his Pack catheter, curled in the bladder and the Pack was removed. The 21 cystoscope was passed down the urethra and there was evidence of previous visual urethrotomy and urethral dilation, but the urethra was negotiable. Using [employer], the urethra was dilated to a 24 Western Ann, the 26 Western Ann would not pass. The cystoscope was reinserted and a Super Stiff wire was placed and with that in place, we were able to pass the 26 and the 28 Beltran sound. Attempts at that point to pass the resectoscope, 26 continuous flow instrument, were not successful. Therefore, the continuous flow sheath was removed and the resectoscope was passed under vision and the tumor was resected without continuous flow frequently evacuating the bladder to prevent bladder perforation. The tumor was involving the right lateral wall and was perhaps 5 cm in size. It had some solid components, but was not obviously invasive. The resection was carried into the muscularis propria based on visual appearance. All the pieces were collected and sent for permanent section analysis. Hemostasis was obtained with electrocautery. At this point, the wires were replaced and an attempt to pass the 24-Peruvian 3-way Pack over the stiff wire was unsuccessful. Therefore, a 22-Peruvian was inserted and was extremely tight to passage, but could be seated in the bladder successfully.   The balloon was inflated with 8 mL and the catheter was put on continuous irrigation and the return was very light pink. At this point, the patient was reacted from the anesthetic and transferred to the recovery in stable condition.       MD FRANCA Huertas / AVTAR  D: 12/21/2017 09:36     T: 12/21/2017 14:02  JOB #: [id number]  CC: [address] [place] [address]

## 2017-12-22 NOTE — PROGRESS NOTES
Day # 1 of Zosyn  Indication:  UTI  Current regimen:  3.375 Q6H  Abx regimen: Zosyn monotherapy   Recent Labs      17   0310   CREA  1.13   BUN  12     Est CrCl: 72.5 ml/min  Temp (24hrs), Av.5 °F (36.9 °C), Min:97.3 °F (36.3 °C), Max:101.1 °F (38.4 °C)    Cultures:   None    Plan: Patient is febrile, urologist is broadening coverage from Cephalexin. Change to 3.375 g loading dose followed by 10.125 g continuous infusion.

## 2017-12-22 NOTE — PROGRESS NOTES
Reviewed medical chart; met with the patient at the bedside. Patient is being seen for bladder tumor. Patient lives with his wife in their home in [place], [place]. Patient is independent with ADLs and IADLs. Patient has a PCP - Dr. Bernadine Rodriguez and gets his prescriptions at [facility] in [place], [place]. The patient was provided with a copy of his observation notice. Patient was given opportunity for questions and clarification. The patient signed a copy of the document, which was then placed on the hard chart. Care Management will continue to follow his disposition. KALEE Peoples    Care Management Interventions  PCP Verified by CM:  Yes  Palliative Care Criteria Met (RRAT>21 & CHF Dx)?: No  Mode of Transport at Discharge:  (Patient will travel home via car.  )  En[employer] Goods: No  Discharge Durable Medical Equipment: No  Physical Therapy Consult: No  Occupational Therapy Consult: No  Speech Therapy Consult: No  Current Support Network: Own Home (Patient lives with his wife.  )  Confirm Follow Up Transport:  (Patient will travel home via car.  )  Plan discussed with Pt/Family/Caregiver: Yes  Freedom of Choice Offered: Yes  Discharge Location  Discharge Placement: Home with family assistance

## 2017-12-22 NOTE — PROGRESS NOTES
Temp 101 this am abd soft. Urine pink on cbi. No sob. No calf tenderness. Plan broaden abiotic coverage. pulm toilet. cx urine, incr pain meds.

## 2017-12-23 LAB
ANION GAP SERPL CALC-SCNC: 7 MMOL/L (ref 5–15)
BACTERIA SPEC CULT: NORMAL
BUN SERPL-MCNC: 14 MG/DL (ref 6–20)
BUN/CREAT SERPL: 11 (ref 12–20)
CALCIUM SERPL-MCNC: 9.1 MG/DL (ref 8.5–10.1)
CC UR VC: NORMAL
CHLORIDE SERPL-SCNC: 104 MMOL/L (ref 97–108)
CO2 SERPL-SCNC: 28 MMOL/L (ref 21–32)
CREAT SERPL-MCNC: 1.24 MG/DL (ref 0.7–1.3)
GLUCOSE SERPL-MCNC: 111 MG/DL (ref 65–100)
HGB BLD-MCNC: 11.4 G/DL (ref 12.1–17)
POTASSIUM SERPL-SCNC: 4.3 MMOL/L (ref 3.5–5.1)
SERVICE CMNT-IMP: NORMAL
SODIUM SERPL-SCNC: 139 MMOL/L (ref 136–145)

## 2017-12-23 PROCEDURE — 77030018836 HC SOL IRR NACL ICUM -A

## 2017-12-23 PROCEDURE — 74011250636 HC RX REV CODE- 250/636: Performed by: UROLOGY

## 2017-12-23 PROCEDURE — 80048 BASIC METABOLIC PNL TOTAL CA: CPT | Performed by: UROLOGY

## 2017-12-23 PROCEDURE — 74011250637 HC RX REV CODE- 250/637: Performed by: UROLOGY

## 2017-12-23 PROCEDURE — 85018 HEMOGLOBIN: CPT | Performed by: UROLOGY

## 2017-12-23 PROCEDURE — 99218 HC RM OBSERVATION: CPT

## 2017-12-23 PROCEDURE — 36415 COLL VENOUS BLD VENIPUNCTURE: CPT | Performed by: UROLOGY

## 2017-12-23 RX ADMIN — Medication 10 ML: at 15:35

## 2017-12-23 RX ADMIN — PIPERACILLIN AND TAZOBACTAM 10.12 G: 3; .375 INJECTION, POWDER, FOR SOLUTION INTRAVENOUS at 10:47

## 2017-12-23 RX ADMIN — ATENOLOL 25 MG: 25 TABLET ORAL at 09:25

## 2017-12-23 RX ADMIN — OXYBUTYNIN CHLORIDE 5 MG: 5 TABLET ORAL at 09:25

## 2017-12-23 RX ADMIN — HYDROCODONE BITARTRATE AND ACETAMINOPHEN 1 TABLET: 5; 325 TABLET ORAL at 18:21

## 2017-12-23 RX ADMIN — BACITRACIN: 500 OINTMENT TOPICAL at 15:36

## 2017-12-23 RX ADMIN — OXYBUTYNIN CHLORIDE 5 MG: 5 TABLET ORAL at 22:42

## 2017-12-23 RX ADMIN — OXYBUTYNIN CHLORIDE 5 MG: 5 TABLET ORAL at 15:35

## 2017-12-23 RX ADMIN — BACITRACIN: 500 OINTMENT TOPICAL at 22:41

## 2017-12-23 RX ADMIN — BACITRACIN: 500 OINTMENT TOPICAL at 09:27

## 2017-12-23 RX ADMIN — HYDROCODONE BITARTRATE AND ACETAMINOPHEN 1 TABLET: 5; 325 TABLET ORAL at 10:50

## 2017-12-23 RX ADMIN — MORPHINE SULFATE 1 MG: 2 INJECTION, SOLUTION INTRAMUSCULAR; INTRAVENOUS at 00:27

## 2017-12-23 RX ADMIN — DOCUSATE SODIUM 100 MG: 100 CAPSULE, LIQUID FILLED ORAL at 09:25

## 2017-12-23 NOTE — PROGRESS NOTES
Pt had to be hand irrigated once throughout night. Urine had been pink and clear, and pt felt pressure. RN removed maybe 15-20 small clots and lucero now flowing with pink clear urine again. Bedside shift change report given to [address] [place] [address] (oncoming nurse) by Roxanne Lyman (offgoing nurse). Report included the following information SBAR, Kardex, Intake/Output and MAR.

## 2017-12-24 VITALS
TEMPERATURE: 97.9 F | HEART RATE: 63 BPM | HEIGHT: 68 IN | WEIGHT: 196 LBS | SYSTOLIC BLOOD PRESSURE: 130 MMHG | DIASTOLIC BLOOD PRESSURE: 71 MMHG | OXYGEN SATURATION: 95 % | BODY MASS INDEX: 29.7 KG/M2 | RESPIRATION RATE: 15 BRPM

## 2017-12-24 PROCEDURE — 74011250637 HC RX REV CODE- 250/637: Performed by: UROLOGY

## 2017-12-24 PROCEDURE — 77030010545

## 2017-12-24 PROCEDURE — 77030018846 HC SOL IRR STRL H20 ICUM -A

## 2017-12-24 PROCEDURE — 99218 HC RM OBSERVATION: CPT

## 2017-12-24 RX ORDER — AMOXICILLIN AND CLAVULANATE POTASSIUM 875; 125 MG/1; MG/1
1 TABLET, FILM COATED ORAL EVERY 12 HOURS
Qty: 14 TAB | Refills: 0 | Status: SHIPPED | OUTPATIENT
Start: 2017-12-24 | End: 2017-12-24

## 2017-12-24 RX ORDER — AMOXICILLIN AND CLAVULANATE POTASSIUM 875; 125 MG/1; MG/1
1 TABLET, FILM COATED ORAL EVERY 12 HOURS
Qty: 14 TAB | Refills: 0 | Status: SHIPPED | OUTPATIENT
Start: 2017-12-24 | End: 2017-12-31

## 2017-12-24 RX ADMIN — HYDROCODONE BITARTRATE AND ACETAMINOPHEN 1 TABLET: 5; 325 TABLET ORAL at 11:08

## 2017-12-24 RX ADMIN — ATENOLOL 25 MG: 25 TABLET ORAL at 08:45

## 2017-12-24 RX ADMIN — BACITRACIN: 500 OINTMENT TOPICAL at 08:46

## 2017-12-24 RX ADMIN — HYDROCODONE BITARTRATE AND ACETAMINOPHEN 1 TABLET: 5; 325 TABLET ORAL at 01:01

## 2017-12-24 RX ADMIN — OXYBUTYNIN CHLORIDE 5 MG: 5 TABLET ORAL at 08:46

## 2017-12-24 RX ADMIN — DOCUSATE SODIUM 100 MG: 100 CAPSULE, LIQUID FILLED ORAL at 08:45

## 2017-12-24 NOTE — PROGRESS NOTES
No current complaints. Catheter hand irrigated once by nursing overnight for some small clots. Urine currently light pink on a fast drip. CBI slowed to slow drip and drainage remained light pink when checked 10 minutes later.    VSS/AF  Abdomen soft  Hgb 11.4  Impression: hematuria and fever improving  Plan:  -hopefully CBI off tomorrow  -possible home tomorrow if able to dc CBI

## 2017-12-24 NOTE — PROGRESS NOTES
Reviewed discharge instructions with patient and family member including follow up appointments, new medications, leg bag and lucero care, and signs and symptoms to notify physician about. Removed IV tip intact.

## 2017-12-24 NOTE — DISCHARGE INSTRUCTIONS
Urology Discharge Instructions     Patient ID:  Maxx Desouza  [id number]  80 y.o.  1955    Admit Date: 12/21/2017    Discharge Date: 12/24/2017     Admission Diagnoses:  BLADDER MASS  Bladder tumor     Discharge Diagnoses: same, urethral stricture    Procedures for this admission: Procedure(s):  CYSTOSCOPY, URETHERAL DILITATION, TRANSURETHRAL RESECTION OF BLADDER TUMOR     Disposition: home    Discharged Condition: good    Patient Instructions:   Call for temperature over 100.5 degrees F. Take a probiotic (like over-the-counter Culturelle) daily while on antibiotics. Drink plenty of water. Activity: no lifting or strenuous exercise for 3 weeks. Follow-up with Dr. Krystal Ratliff as scheduled.     Signed:  Herb Schroeder MD  12/24/2017  2:01 PM

## 2017-12-24 NOTE — PROGRESS NOTES
Bedside shift change report given to [address] [place] [address] (oncoming nurse) by Wes Thomas (offgoing nurse). Report included the following information SBAR, Kardex, Intake/Output and MAR.

## 2017-12-24 NOTE — PROGRESS NOTES
Bedside shift change report given to Corinne, RN (oncoming nurse) by Reena Mariano RN (offgoing nurse). Report included the following information SBAR, Kardex, Intake/Output and Recent Results.

## 2017-12-24 NOTE — PROGRESS NOTES
Urine clearer. CBI stopped and reobserved 30 mins later - still light pink. VSS/AF  Urine culture neg  Shared good pathology report with patient and family (low grade, noninvasive urothelial carcinoma), Ta  Imp: hematuria and fever resolved  Plan:  -d/c to home with lucero.  He has follow up scheduled next week for voiding trial.

## 2018-01-23 NOTE — ADT AUTH CERT NOTES
Patient Demographics        Patient Name [address] Sex  Address Phone       Trina White [phone] [facility] 1955 [address] [facility]  [phone] [facility])  [phone] (Work)  [phone] (Mobile) *Preferred*           CSN:       [id number]           Admit Date: Admit Time Room Bed       Dec 21, 2017  6:41  [[id number]] 01 [[id number]]           Attending Providers        Provider Pager From To       Abelino Magana MD  17           Emergency Contact(s)        Name Relation Home Work [address] Spouse [phone]         Utilization Review           cont obs  by Janet Hernandez RN        Review Entered Review Status       2017 In Primary       Details         98.1, 58, 16, 112/65, 95%      Meds: Norco5/325mg 1 tab po x2, morphine 1mg IV, Ditropan 5mg po 3x/d, Zosyn 10.125mg IV     Labs: hgb 11.4, K+ 4.3, glucose 111, GRF est non AA 59        Urology note:     No current complaints. Catheter hand irrigated once by nursing overnight for some small clots. Urine currently light pink on a fast drip. CBI slowed to slow drip and drainage remained light pink when checked 10 minutes later. VSS/AF  Abdomen soft  Hgb 11.4  Impression: hematuria and fever improving  Plan:  -hopefully CBI off tomorrow  -possible home tomorrow if able to dc CBI                  OBS REVIEW by Arash Organ        Review Entered Review Status       2017 In Primary       Details          OBSERVATION/SURGICAL  111/68, 61, 18, 98.4, 94%  D5 1/2NS @ 100 CONT IV  NORCO 5 MG/1 TAB Q6H PRN PO X 1  NORCO 5 MG /1-2 TAB /Q6H PRN PO X 1(2 TAB)  MORPHINE 1 MG Q2H PRN IV X 1  ZOSYN 3.375G NOW IV  ZOSYN 10.125 G Q24H IV  OFIRMEV 1000 MG Q6H IV      Temp 101 this am abd soft. Urine pink on cbi.  No sob.  No calf tenderness.  Plan broaden abiotic coverage. pulm toilet.  cx urine, incr pain meds

## 2018-05-15 ENCOUNTER — TELEPHONE (OUTPATIENT)
Dept: CARDIOLOGY CLINIC | Age: 63
End: 2018-05-15

## 2018-05-15 NOTE — TELEPHONE ENCOUNTER
[facility] has requested a cardiac clearance for this patient. Surgeon Evelyne Restrepo: cysto with urethral dilation,visual urethrotomy under General anesthesia. Can patient stop Aspirin for this procedure and for how long? Pt was seen in December 2017 and to be seen on a PRN bases. Documents on desk to review. Please advise.      [facility]   Phone # [phone] Fax # [phone]

## 2018-05-16 NOTE — TELEPHONE ENCOUNTER
Allen Spears, MD Marylen Highman, RN        Caller: Unspecified (Yesterday,  3:09 PM)                     OK to proceed stop ASA 5 days prior       Surgical clearance faxed to [facility]

## 2019-05-13 ENCOUNTER — HOSPITAL ENCOUNTER (OUTPATIENT)
Dept: MRI IMAGING | Age: 64
Discharge: HOME | End: 2019-05-13
Attending: ORTHOPAEDIC SURGERY
Payer: MEDICARE

## 2019-05-13 DIAGNOSIS — S83.242A ACUTE MEDIAL MENISCUS TEAR OF LEFT KNEE: ICD-10-CM

## 2019-05-13 DIAGNOSIS — M25.562 LEFT KNEE PAIN: ICD-10-CM

## 2019-05-13 PROCEDURE — 73721 MRI JNT OF LWR EXTRE W/O DYE: CPT

## 2019-06-03 ENCOUNTER — OFFICE VISIT (OUTPATIENT)
Dept: CARDIOLOGY CLINIC | Age: 64
End: 2019-06-03

## 2019-06-03 VITALS
DIASTOLIC BLOOD PRESSURE: 76 MMHG | BODY MASS INDEX: 29.13 KG/M2 | SYSTOLIC BLOOD PRESSURE: 116 MMHG | HEART RATE: 61 BPM | WEIGHT: 192.2 LBS | RESPIRATION RATE: 16 BRPM | OXYGEN SATURATION: 96 % | HEIGHT: 68 IN

## 2019-06-03 DIAGNOSIS — Z01.818 PRE-OP EVALUATION: ICD-10-CM

## 2019-06-03 DIAGNOSIS — I47.1 PAROXYSMAL SUPRAVENTRICULAR TACHYCARDIA (HCC): Primary | ICD-10-CM

## 2019-06-03 DIAGNOSIS — I45.6 WPW (WOLFF-PARKINSON-WHITE SYNDROME): ICD-10-CM

## 2019-06-03 DIAGNOSIS — I10 ESSENTIAL HYPERTENSION, BENIGN: ICD-10-CM

## 2019-06-03 NOTE — PROGRESS NOTES
HISTORY OF PRESENT ILLNESS  Roxana Moe is a 71 y.o. male. He has a history of WPW syndrome and supraventricular tachycardia. He has been on low dose Atenolol for over 20 years and it has controlled the tachycardia. When he once tried to stop it, it came back. I saw him a year and a half ago prior to surgery for bladder cancer. He did well at that time and is free of recurrence. He spends part of the year in [place] and is very active playing platform tennis and golfing. He was walking 4-5 miles a day also at the time, but he sustained a tear in the meniscus in his last knee and now needs to have surgery by Dr. Everette Lee. He has no chest pain or shortness of breath and previously had stress testing that was unremarkable. HPI  Patient Active Problem List   Diagnosis Code    Paroxysmal supraventricular tachycardia ([facility] [address] I47.1    Other and unspecified hyperlipidemia E78.5    Essential hypertension, benign I10    Chest pain, unspecified R07.9    Bladder mass N32.89    Bladder tumor D49.4     Current Outpatient Medications   Medication Sig Dispense Refill    atorvastatin (LIPITOR) 20 mg tablet Take 20 mg by mouth daily.  tamsulosin (FLOMAX) 0.4 mg capsule Take 0.4 mg by mouth nightly.  atenolol (TENORMIN) 25 mg tablet Take 25 mg by mouth daily. Past Medical History:   Diagnosis Date    Bladder cancer ([facility] [address] 12/2017    Cancer of skin of left leg     WPW (Nancy-Parkinson-White syndrome) 1997     Past Surgical History:   Procedure Laterality Date    HX COLONOSCOPY      HX ORTHOPAEDIC      KNEE SCOPE    HX UROLOGICAL  12/13/2017    CYSTO       Review of Systems   Musculoskeletal: Positive for joint pain. All other systems reviewed and are negative.     Visit Vitals  /76 (BP 1 Location: Left arm, BP Patient Position: Sitting)   Pulse 61   Resp 16   Ht 5' 8\" (1.727 m)   Wt 192 lb 3.2 oz (87.2 kg)   SpO2 96%   BMI 29.22 kg/m²       Physical Exam   Constitutional: He is oriented to person, place, and time. He appears well-nourished. HENT:   Head: Atraumatic. Eyes: Conjunctivae are normal.   Neck: Neck supple. Cardiovascular: Normal rate, regular rhythm and normal heart sounds. Exam reveals no gallop and no friction rub. No murmur heard. Pulmonary/Chest: Breath sounds normal. He has no wheezes. He has no rales. Abdominal: Bowel sounds are normal.   Musculoskeletal: He exhibits no edema. Neurological: He is oriented to person, place, and time. No cranial nerve deficit. Skin: Skin is dry. Psychiatric: His behavior is normal.   Nursing note and vitals reviewed. ASSESSMENT and PLAN  Previously he had bradycardia on low dose Atenolol but his heart rate is now low normal. His blood pressure is well controlled. He was very active without chest pain prior to the knee injury and I believe he can safely undergo the surgery without repeating any cardiology testing. I will continue to see him back on an as needed basis.

## 2022-03-18 PROBLEM — N32.89 BLADDER MASS: Status: ACTIVE | Noted: 2017-12-18

## 2022-03-19 PROBLEM — D49.4 BLADDER TUMOR: Status: ACTIVE | Noted: 2017-12-21

## 2023-06-16 ENCOUNTER — HOSPITAL ENCOUNTER (OUTPATIENT)
Facility: HOSPITAL | Age: 68
Discharge: HOME | End: 2023-06-19
Payer: MEDICARE

## 2023-06-16 VITALS
BODY MASS INDEX: 28.82 KG/M2 | HEIGHT: 69 IN | SYSTOLIC BLOOD PRESSURE: 131 MMHG | WEIGHT: 194.6 LBS | RESPIRATION RATE: 16 BRPM | DIASTOLIC BLOOD PRESSURE: 77 MMHG | HEART RATE: 65 BPM | OXYGEN SATURATION: 97 % | TEMPERATURE: 97.8 F

## 2023-06-16 DIAGNOSIS — I10 ESSENTIAL HYPERTENSION, BENIGN: Primary | ICD-10-CM

## 2023-06-16 LAB
ANION GAP SERPL CALC-SCNC: 3 MMOL/L (ref 5–15)
BUN SERPL-MCNC: 24 MG/DL (ref 6–20)
BUN/CREAT SERPL: 18 (ref 12–20)
CALCIUM SERPL-MCNC: 9.3 MG/DL (ref 8.5–10.1)
CHLORIDE SERPL-SCNC: 108 MMOL/L (ref 97–108)
CO2 SERPL-SCNC: 30 MMOL/L (ref 21–32)
CREAT SERPL-MCNC: 1.36 MG/DL (ref 0.7–1.3)
GLUCOSE SERPL-MCNC: 93 MG/DL (ref 65–100)
POTASSIUM SERPL-SCNC: 4.8 MMOL/L (ref 3.5–5.1)
SODIUM SERPL-SCNC: 141 MMOL/L (ref 136–145)

## 2023-06-16 PROCEDURE — 36415 COLL VENOUS BLD VENIPUNCTURE: CPT

## 2023-06-16 PROCEDURE — 80048 BASIC METABOLIC PNL TOTAL CA: CPT

## 2023-06-16 PROCEDURE — 93005 ELECTROCARDIOGRAM TRACING: CPT | Performed by: PODIATRIST

## 2023-06-16 RX ORDER — ZOLPIDEM TARTRATE 10 MG/1
TABLET ORAL
COMMUNITY
Start: 2023-04-24

## 2023-06-16 RX ORDER — ATENOLOL 25 MG/1
25 TABLET ORAL EVERY MORNING
COMMUNITY

## 2023-06-16 RX ORDER — ATORVASTATIN CALCIUM 20 MG/1
20 TABLET, FILM COATED ORAL EVERY MORNING
COMMUNITY

## 2023-06-16 RX ORDER — VALACYCLOVIR HYDROCHLORIDE 1 G/1
TABLET, FILM COATED ORAL
COMMUNITY

## 2023-06-16 NOTE — PERIOP NOTE
[address], [address] [facility]   MAIN OR                                  [phone]   MAIN PRE OP                          [phone]                                                                                AMBULATORY PRE OP          [phone]  PRE-ADMISSION TESTING    [phone]   Surgery Date:  Friday 6/23/23       Is surgery arrival time given by surgeon? NO  If NO, [facility] staff will call you between 3 and 7pm the day before your surgery with your arrival time. (If your surgery is on a Monday, we will call you the Friday before.)    Call [phone] after 7pm Monday-Friday if you did not receive this call. INSTRUCTIONS BEFORE YOUR SURGERY   When You  Arrive Arrive at the 2nd [address] [facility] on the day of your surgery  Have your insurance card, photo ID, and any copayment (if needed)   Food   and   Drink NO food or drink after midnight the night before surgery    This means NO water, gum, mints, coffee, juice, etc.  No alcohol (beer, wine, liquor) 24 hours before and after surgery   Medications to   TAKE   Morning of Surgery MEDICATIONS TO TAKE THE MORNING OF SURGERY WITH A SIP OF WATER:   Atenolol  atorvastatin   Medications  To  STOP      7 days before surgery Non-Steroidal anti-inflammatory Drugs (NSAID's): for example, Ibuprofen (Advil, Motrin), Naproxen (Aleve)  Aspirin, if taking for pain   Herbal supplements, vitamins, and fish oil  Other:  (Pain medications not listed above, including Tylenol may be taken)   Blood  Thinners If you take  Aspirin, Plavix, Coumadin, or any blood-thinning or anti-blood clot medicine, talk to the doctor who prescribed the medications for pre-operative instructions.    Bathing Clothing  Jewelry  Valuables     If you shower the morning of surgery, please do not apply anything to your skin (lotions, powders, deodorant, or makeup, especially mascara)  Follow Chlorhexidine Care

## 2023-06-18 LAB
EKG ATRIAL RATE: 57 BPM
EKG DIAGNOSIS: NORMAL
EKG P AXIS: 56 DEGREES
EKG P-R INTERVAL: 184 MS
EKG Q-T INTERVAL: 428 MS
EKG QRS DURATION: 90 MS
EKG QTC CALCULATION (BAZETT): 416 MS
EKG R AXIS: 6 DEGREES
EKG T AXIS: 20 DEGREES
EKG VENTRICULAR RATE: 57 BPM

## 2023-06-23 ENCOUNTER — HOSPITAL ENCOUNTER (OUTPATIENT)
Facility: HOSPITAL | Age: 68
Setting detail: OUTPATIENT SURGERY
Discharge: HOME | End: 2023-06-23
Attending: PODIATRIST | Admitting: PODIATRIST
Payer: MEDICARE

## 2023-06-23 ENCOUNTER — HOSPITAL ENCOUNTER (OUTPATIENT)
Facility: HOSPITAL | Age: 68
Discharge: HOME | End: 2023-06-26

## 2023-06-23 ENCOUNTER — ANESTHESIA (OUTPATIENT)
Facility: HOSPITAL | Age: 68
End: 2023-06-23
Payer: MEDICARE

## 2023-06-23 ENCOUNTER — ANESTHESIA EVENT (OUTPATIENT)
Facility: HOSPITAL | Age: 68
End: 2023-06-23
Payer: MEDICARE

## 2023-06-23 VITALS
SYSTOLIC BLOOD PRESSURE: 126 MMHG | WEIGHT: 195.11 LBS | HEART RATE: 51 BPM | TEMPERATURE: 97.5 F | DIASTOLIC BLOOD PRESSURE: 77 MMHG | RESPIRATION RATE: 12 BRPM | BODY MASS INDEX: 28.81 KG/M2 | OXYGEN SATURATION: 100 %

## 2023-06-23 DIAGNOSIS — M79.672 LEFT FOOT PAIN: Primary | ICD-10-CM

## 2023-06-23 PROCEDURE — 7100000010 HC PHASE II RECOVERY - FIRST 15 MIN: Performed by: PODIATRIST

## 2023-06-23 PROCEDURE — 97162 PT EVAL MOD COMPLEX 30 MIN: CPT

## 2023-06-23 PROCEDURE — C1713 ANCHOR/SCREW BN/BN,TIS/BN: HCPCS | Performed by: PODIATRIST

## 2023-06-23 PROCEDURE — 97116 GAIT TRAINING THERAPY: CPT

## 2023-06-23 PROCEDURE — 6360000002 HC RX W HCPCS: Performed by: PODIATRIST

## 2023-06-23 PROCEDURE — 6360000002 HC RX W HCPCS: Performed by: NURSE ANESTHETIST, CERTIFIED REGISTERED

## 2023-06-23 PROCEDURE — 3700000000 HC ANESTHESIA ATTENDED CARE: Performed by: PODIATRIST

## 2023-06-23 PROCEDURE — C1769 GUIDE WIRE: HCPCS | Performed by: PODIATRIST

## 2023-06-23 PROCEDURE — 6360000002 HC RX W HCPCS: Performed by: ANESTHESIOLOGY

## 2023-06-23 PROCEDURE — 2580000003 HC RX 258: Performed by: PODIATRIST

## 2023-06-23 PROCEDURE — 6370000000 HC RX 637 (ALT 250 FOR IP): Performed by: ANESTHESIOLOGY

## 2023-06-23 PROCEDURE — 7100000000 HC PACU RECOVERY - FIRST 15 MIN: Performed by: PODIATRIST

## 2023-06-23 PROCEDURE — 7100000011 HC PHASE II RECOVERY - ADDTL 15 MIN: Performed by: PODIATRIST

## 2023-06-23 PROCEDURE — 2709999900 HC NON-CHARGEABLE SUPPLY: Performed by: PODIATRIST

## 2023-06-23 PROCEDURE — 2580000003 HC RX 258: Performed by: ANESTHESIOLOGY

## 2023-06-23 PROCEDURE — 64445 NJX AA&/STRD SCIATIC NRV IMG: CPT | Performed by: ANESTHESIOLOGY

## 2023-06-23 PROCEDURE — 3700000001 HC ADD 15 MINUTES (ANESTHESIA): Performed by: PODIATRIST

## 2023-06-23 PROCEDURE — 2500000003 HC RX 250 WO HCPCS: Performed by: NURSE ANESTHETIST, CERTIFIED REGISTERED

## 2023-06-23 PROCEDURE — 3600000004 HC SURGERY LEVEL 4 BASE: Performed by: PODIATRIST

## 2023-06-23 PROCEDURE — 3600000014 HC SURGERY LEVEL 4 ADDTL 15MIN: Performed by: PODIATRIST

## 2023-06-23 PROCEDURE — 97530 THERAPEUTIC ACTIVITIES: CPT

## 2023-06-23 PROCEDURE — 7100000001 HC PACU RECOVERY - ADDTL 15 MIN: Performed by: PODIATRIST

## 2023-06-23 DEVICE — IMPLANTABLE DEVICE: Type: IMPLANTABLE DEVICE | Site: SECOND TOE | Status: FUNCTIONAL

## 2023-06-23 DEVICE — SCREW BNE L13MM DIA2MM CORT FT ANK TI SELF DRL ST SLD FULL: Type: IMPLANTABLE DEVICE | Site: SECOND TOE | Status: FUNCTIONAL

## 2023-06-23 RX ORDER — LIDOCAINE HYDROCHLORIDE 20 MG/ML
INJECTION, SOLUTION EPIDURAL; INFILTRATION; INTRACAUDAL; PERINEURAL PRN
Status: DISCONTINUED | OUTPATIENT
Start: 2023-06-23 | End: 2023-06-23 | Stop reason: SDUPTHER

## 2023-06-23 RX ORDER — SODIUM CHLORIDE, SODIUM LACTATE, POTASSIUM CHLORIDE, CALCIUM CHLORIDE 600; 310; 30; 20 MG/100ML; MG/100ML; MG/100ML; MG/100ML
INJECTION, SOLUTION INTRAVENOUS CONTINUOUS
Status: DISCONTINUED | OUTPATIENT
Start: 2023-06-23 | End: 2023-06-23 | Stop reason: HOSPADM

## 2023-06-23 RX ORDER — OXYCODONE HYDROCHLORIDE AND ACETAMINOPHEN 5; 325 MG/1; MG/1
1 TABLET ORAL EVERY 4 HOURS PRN
Qty: 30 TABLET | Refills: 0 | Status: SHIPPED | OUTPATIENT
Start: 2023-06-23 | End: 2023-06-28

## 2023-06-23 RX ORDER — ACETAMINOPHEN 325 MG/1
650 TABLET ORAL ONCE
Status: COMPLETED | OUTPATIENT
Start: 2023-06-23 | End: 2023-06-23

## 2023-06-23 RX ORDER — MEPERIDINE HYDROCHLORIDE 25 MG/ML
12.5 INJECTION INTRAMUSCULAR; INTRAVENOUS; SUBCUTANEOUS EVERY 5 MIN PRN
Status: DISCONTINUED | OUTPATIENT
Start: 2023-06-23 | End: 2023-06-23 | Stop reason: HOSPADM

## 2023-06-23 RX ORDER — DROPERIDOL 2.5 MG/ML
0.62 INJECTION, SOLUTION INTRAMUSCULAR; INTRAVENOUS
Status: DISCONTINUED | OUTPATIENT
Start: 2023-06-23 | End: 2023-06-23 | Stop reason: HOSPADM

## 2023-06-23 RX ORDER — ONDANSETRON 2 MG/ML
4 INJECTION INTRAMUSCULAR; INTRAVENOUS
Status: DISCONTINUED | OUTPATIENT
Start: 2023-06-23 | End: 2023-06-23 | Stop reason: HOSPADM

## 2023-06-23 RX ORDER — FENTANYL CITRATE 50 UG/ML
INJECTION, SOLUTION INTRAMUSCULAR; INTRAVENOUS PRN
Status: DISCONTINUED | OUTPATIENT
Start: 2023-06-23 | End: 2023-06-23 | Stop reason: SDUPTHER

## 2023-06-23 RX ORDER — PROPOFOL 10 MG/ML
INJECTION, EMULSION INTRAVENOUS CONTINUOUS PRN
Status: DISCONTINUED | OUTPATIENT
Start: 2023-06-23 | End: 2023-06-23 | Stop reason: SDUPTHER

## 2023-06-23 RX ORDER — LIDOCAINE HYDROCHLORIDE 10 MG/ML
1 INJECTION, SOLUTION EPIDURAL; INFILTRATION; INTRACAUDAL; PERINEURAL
Status: DISCONTINUED | OUTPATIENT
Start: 2023-06-23 | End: 2023-06-23 | Stop reason: HOSPADM

## 2023-06-23 RX ORDER — ROPIVACAINE HYDROCHLORIDE 5 MG/ML
INJECTION, SOLUTION EPIDURAL; INFILTRATION; PERINEURAL
Status: DISCONTINUED | OUTPATIENT
Start: 2023-06-23 | End: 2023-06-23 | Stop reason: SDUPTHER

## 2023-06-23 RX ORDER — LABETALOL HYDROCHLORIDE 5 MG/ML
10 INJECTION, SOLUTION INTRAVENOUS
Status: DISCONTINUED | OUTPATIENT
Start: 2023-06-23 | End: 2023-06-23 | Stop reason: HOSPADM

## 2023-06-23 RX ORDER — DIPHENHYDRAMINE HYDROCHLORIDE 50 MG/ML
12.5 INJECTION INTRAMUSCULAR; INTRAVENOUS
Status: DISCONTINUED | OUTPATIENT
Start: 2023-06-23 | End: 2023-06-23 | Stop reason: HOSPADM

## 2023-06-23 RX ORDER — MIDAZOLAM HYDROCHLORIDE 1 MG/ML
INJECTION INTRAMUSCULAR; INTRAVENOUS PRN
Status: DISCONTINUED | OUTPATIENT
Start: 2023-06-23 | End: 2023-06-23 | Stop reason: SDUPTHER

## 2023-06-23 RX ADMIN — SODIUM CHLORIDE, POTASSIUM CHLORIDE, SODIUM LACTATE AND CALCIUM CHLORIDE: 600; 310; 30; 20 INJECTION, SOLUTION INTRAVENOUS at 07:09

## 2023-06-23 RX ADMIN — FENTANYL CITRATE 50 MCG: 50 INJECTION, SOLUTION INTRAMUSCULAR; INTRAVENOUS at 07:10

## 2023-06-23 RX ADMIN — ROPIVACAINE HYDROCHLORIDE 10 ML: 5 INJECTION, SOLUTION EPIDURAL; INFILTRATION; PERINEURAL at 07:16

## 2023-06-23 RX ADMIN — ACETAMINOPHEN 650 MG: 325 TABLET ORAL at 07:04

## 2023-06-23 RX ADMIN — MIDAZOLAM HYDROCHLORIDE 2 MG: 1 INJECTION, SOLUTION INTRAMUSCULAR; INTRAVENOUS at 07:10

## 2023-06-23 RX ADMIN — CEFAZOLIN SODIUM 2000 MG: 1 POWDER, FOR SOLUTION INTRAMUSCULAR; INTRAVENOUS at 07:47

## 2023-06-23 RX ADMIN — ROPIVACAINE HYDROCHLORIDE 30 ML: 5 INJECTION, SOLUTION EPIDURAL; INFILTRATION; PERINEURAL at 07:15

## 2023-06-23 RX ADMIN — PROPOFOL 100 MCG/KG/MIN: 10 INJECTION, EMULSION INTRAVENOUS at 07:41

## 2023-06-23 RX ADMIN — LIDOCAINE HYDROCHLORIDE 100 MG: 20 INJECTION, SOLUTION EPIDURAL; INFILTRATION; INTRACAUDAL; PERINEURAL at 07:41

## 2023-06-23 RX ADMIN — FENTANYL CITRATE 50 MCG: 50 INJECTION, SOLUTION INTRAMUSCULAR; INTRAVENOUS at 07:39

## 2023-06-23 ASSESSMENT — PAIN SCALES - GENERAL
PAINLEVEL_OUTOF10: 0
PAINLEVEL_OUTOF10: 0

## 2023-06-23 ASSESSMENT — PAIN - FUNCTIONAL ASSESSMENT: PAIN_FUNCTIONAL_ASSESSMENT: 0-10

## 2023-06-23 NOTE — ANESTHESIA PROCEDURE NOTES
Peripheral Block    Patient location during procedure: pre-op  Reason for block: procedure for pain, post-op pain management, primary anesthetic and at surgeon's request  Start time: 6/23/2023 7:15 AM  End time: 6/23/2023 7:20 AM  Preanesthetic Checklist  Completed: patient identified, IV checked, site marked, risks and benefits discussed, surgical/procedural consents, pre-op evaluation, timeout performed, anesthesia consent given, oxygen available and monitors applied/VS acknowledged  Peripheral Block   Patient position: supine  Prep: ChloraPrep  Provider prep: mask and sterile gloves  Patient monitoring: cardiac monitor, continuous pulse ox, continuous capnometry, frequent blood pressure checks, IV access, oxygen and responsive to questions  Block type: Sciatic  Popliteal  Laterality: left  Injection technique: single-shot  Guidance: nerve stimulator and ultrasound guided    Needle   Needle type: Other   Needle gauge: 21 G  Needle localization: nerve stimulator and ultrasound guidance  Needle length: 10 cmOther needle type: STIMUPLEX  Assessment   Injection assessment: negative aspiration for heme, no paresthesia on injection, local visualized surrounding nerve on ultrasound and no intravascular symptoms  Hemodynamics: stable  Outcomes: patient tolerated procedure well    Additional Notes  Saphenous block done under ultrasound 10cc Ropiv.   Medications Administered  ropivacaine (NAROPIN) injection 0.5% - Perineural   30 mL - 6/23/2023 7:15:00 AM   10 mL - 6/23/2023 7:16:00 AM

## 2023-06-23 NOTE — PERIOP NOTE
Timeout for block to left foot done @0716 with armida, HEATHER Myers and Dr. Claudeen Sager at pt bedside.

## 2023-06-23 NOTE — ANESTHESIA PRE PROCEDURE
Department of Anesthesiology  Preprocedure Note       Name:  Owen Dumont   Age:  70 y.o.  :  1955                                          MRN:  [id number]         Date:  2023      Surgeon: Sameera Horton): Meenu Nieto DPM    Procedure: Procedure(s):  LEFT SECOND METATARSAL OSTEOTOMY, LEFT SECOND DIGIT HAMMER TOE REPAIR (GEN W/BLOCK)    Medications prior to admission:   Prior to Admission medications    Medication Sig Start Date End Date Taking?  Authorizing Provider   atenolol (TENORMIN) 25 MG tablet Take 1 tablet by mouth every morning    Historical Provider, MD   atorvastatin (LIPITOR) 20 MG tablet Take 1 tablet by mouth every morning    Historical Provider, MD   valACYclovir (VALTREX) 1 g tablet valacyclovir 1 gram tablet    Historical Provider, MD   zolpidem (AMBIEN) 10 MG tablet TAKE 1 TABLET (10 MG) BY MOUTH ONCE DAILY AT BEDTIME AS NEEDED 23   Historical Provider, MD       Current medications:    Current Facility-Administered Medications   Medication Dose Route Frequency Provider Last Rate Last Admin    lidocaine PF 1 % injection 1 mL  1 mL IntraDERmal Once PRN Keturah Nan, DO        lactated ringers IV soln infusion   IntraVENous Continuous Keturah Nan, DO        ceFAZolin (ANCEF) 2,000 mg in sterile water 20 mL IV syringe  2,000 mg IntraVENous Once Meenu Nieto DPM           Allergies:  No Known Allergies    Problem List:    Patient Active Problem List   Diagnosis Code    Paroxysmal supraventricular tachycardia ([facility] [address] I47.1    Chest pain, unspecified R07.9    Bladder mass N32.89    Bladder tumor D49.4    Essential hypertension, benign I10       Past Medical History:        Diagnosis Date    Bladder cancer ([facility] [address] 2017    Cancer of skin of left leg     Herpes     used valtrex 3 mo ago    Hyperlipidemia     Hypertension     Prediabetes     WPW (Marquise-Parkinson-White syndrome)     no longer seeing cardiology; stable       Past Surgical History:        Procedure

## 2023-06-23 NOTE — BRIEF OP NOTE
Brief Postoperative Note      Patient: Cynthia Hardy  YOB: 1955  MRN: [id number]    Date of Procedure: 6/23/2023    Pre-Op Diagnosis Codes:     * Hammer toe of left foot [M20.42]     * Metatarsalgia of left foot [M77.42]     * Left foot pain [M79.672]    Post-Op Diagnosis: Same       Procedure(s):  LEFT SECOND METATARSAL OSTEOTOMY, LEFT SECOND DIGIT HAMMER TOE REPAIR (GEN W/BLOCK)    Surgeon(s): Wilmer Strong DPM    Assistant:  Surgical Assistant: Nalini Santo    Anesthesia: General    Estimated Blood Loss (mL): Minimal    Complications: None    Specimens:   * No specimens in log *    Implants:  Implant Name Type Inv. Item Serial No.  Lot No. LRB No. Used Action   snap off   N/A  N/A Left 1 Implanted   2.5 x 38compression FT screw   N/A  N/A Left 1 Implanted         Drains: * No LDAs found *    Findings: Left second digit hammer toe. See operative report for details.       Electronically signed by Wilmer Strong DPM on 6/23/2023 at 8:54 AM

## 2023-06-23 NOTE — DISCHARGE INSTRUCTIONS
Foot and Ankle Surgery Postop Care  Dr. Rossy Chavez. Carol Ricardo      Most importantly, go home and rest.    Elevate your foot to heart level as much as possible. You may use ice to help with the pain. If in posterior splint or soft cast, you may place ice behind the knee. Note: Frozen peas work fine :)    Loosen and rewrap ace bandage in 4 to 5 hours if it feels too tight. Do NOT bear weight on your foot unless specifically allowed to do so. (Touch down for balance is okay.)    You may bathe, but you MUST keep the dressing dry. You may take one Percocet 5/325 mg every 6 hours prn pain. Avoid making important decisions or driving until you are no longer taking the prescribed pain medication. Important signs and symptoms:      If any of the following signs and symptoms occurs, you should contact Dr. Celene Cabot office. Please be advised if a problem arises which you feel requires immediate medical attention or you are unable to contact Dr. Celene Cabot office you should seek immediate medical attention. Signs and symptoms to watch for include:    1. A sudden increase in swelling and /or redness or warmth at the area your surgery was performed which isnt relieved by rest, ice and elevation. 2.  Oral temperature greater than 101 degrees for 12 hours or more which isnt relieved by an increase in fluid intake and taking two Tylenol every 4-6 hours. Do not exceed 4000mg of Tylenol per day. 3.  Excessive drainage from your incisions or drainage that hasnt stopped by 72 hours. 4.  Calf pain, tenderness, redness or swelling which isnt relieved with rest and elevation. 5.  Fever, chills, nausea, vomiting or other signs and symptoms which are of concern to you. 6.  If sudden shortness of breath or chest pain occur, go to the ER or call an ambulance immediately! Call Dr. Carol Ricardo on your way to the hospital or after you arrive. Follow up:     Your Post-op appointment date & time: As scheduled  Location: [address]

## 2023-06-23 NOTE — ANESTHESIA POSTPROCEDURE EVALUATION
Department of Anesthesiology  Postprocedure Note    Patient: Reny Davenport  MRN: [id number]  YOB: 1955  Date of evaluation: 6/23/2023      Procedure Summary     Date: 06/23/23 Room / Location: [facility] MAIN OR  / [facility] MAIN OR    Anesthesia Start: 6533 Anesthesia Stop: 6010    Procedure: LEFT SECOND METATARSAL OSTEOTOMY, LEFT SECOND DIGIT HAMMER TOE REPAIR (GEN W/BLOCK) (Left: Second Toe) Diagnosis:       Hammer toe of left foot      Metatarsalgia of left foot      Left foot pain      (Hammer toe of left foot [M20.42])      (Metatarsalgia of left foot [M77.42])      (Left foot pain [M79.672])    Surgeons: Neville Leon DPM Responsible Provider: Sadi Ramsey MD    Anesthesia Type: regional ASA Status: 2          Anesthesia Type: No value filed.     Lance Phase I:      Lance Phase II: Lance Score: 10      Anesthesia Post Evaluation    Patient location during evaluation: PACU  Patient participation: complete - patient participated  Level of consciousness: awake  Airway patency: patent  Nausea & Vomiting: no vomiting and no nausea  Complications: no  Cardiovascular status: hemodynamically stable  Respiratory status: acceptable  Hydration status: stable

## 2023-06-23 NOTE — PROGRESS NOTES
PHYSICAL THERAPY EVALUATION/DISCHARGE    Patient: Rodney Toro (77 y.o. male)  Date: 6/23/2023  Primary Diagnosis: Hammer toe of left foot [M20.42]  Metatarsalgia of left foot [M77.42]  Left foot pain [M79.672]  Procedure(s) (LRB):  LEFT SECOND METATARSAL OSTEOTOMY, LEFT SECOND DIGIT HAMMER TOE REPAIR (GEN W/BLOCK) (Left) Day of Surgery   Precautions:                      ASSESSMENT AND RECOMMENDATIONS:  Based on the objective data below, the patient seen post op above surgery and presents with need for SBA for transfers and amb 35' with RW, maintaining NWB LLE. Instructed in stair climbing and demonstrates S- MOD I with use of single crutch and handrail NWB LLE. Patient fitted with post op shoe - provided two sizes and fitted with larger of the two for surgical site protection. Patient with sciatic popliteal and saphenous nerve block, lacks ankle sensation and motor control 0/5 DF/PF and compensates with hip and knee flexion. Patient lucid and alert, wife provided assistance with toilet transfer as well as assisted on stairs. Patient issued RW and crutches for discharge and wife given gait belt. Patient instructed in heel touch down using RW after nerve block resolves via demonstration. Further skilled acute physical therapy is not indicated at this time. PLAN :  Recommendation for discharge: (in order for the patient to meet his/her long term goals): No skilled physical therapy    Other factors to consider for discharge: NWB LLE until nerve block resolves then heel touch weight bearing when block effects absent    IF patient discharges home will need the following DME: axillary crutches, gait belt, and rolling walker       SUBJECTIVE:   Patient stated I am not feeling any pain in my foot.     OBJECTIVE DATA SUMMARY:     Past Medical History:   Diagnosis Date    Bladder cancer ([facility] [address] 12/2017    Cancer of skin of left leg     Herpes     used valtrex 3 mo ago    Hyperlipidemia     Hypertension

## 2023-06-23 NOTE — INTERVAL H&P NOTE
Update History & Physical    The patient's History and Physical of June 21, 2023 was reviewed with the patient and I examined the patient. There was no change. The surgical site was confirmed by the patient and me. Plan: The risks, benefits, expected outcome, and alternative to the recommended procedure have been discussed with the patient. Patient understands and wants to proceed with the procedure.      Electronically signed by Jeff Sharpe DPM on 6/23/2023 at 7:29 AM

## 2023-06-24 NOTE — OP NOTE
[facility] 78  OPERATIVE REPORT    Name:  Angela Ritchie  MR#:  [id number]  :  1955  ACCOUNT #:  [de-identified]  DATE OF SERVICE:  2023    PREOPERATIVE DIAGNOSES:  1. Left foot second digit hammertoe. 2.  Left foot metatarsalgia. 3.  Left deformed second metatarsal.    POSTOPERATIVE DIAGNOSES:  1. Left foot second digit hammertoe. 2.  Left foot metatarsalgia. 3.  Left deformed second metatarsal.    PROCEDURES PERFORMED:  1. Left second digit hammertoe repair. 2.  Left second metatarsal Weil osteotomy. SURGEON:  Zo Herrera DPM    ASSISTANT:  Shelly Ruth    ANESTHESIA:  MAC with regional block. COMPLICATIONS:  None. SPECIMENS REMOVED:  None. MATERIALS/IMPLANTS:  1.  2.5 mm x 38 mm Arthrex Compression screw. 2.  Arthrex 2.0 mm x 13 mm Snap-Off screw. ESTIMATED BLOOD LOSS:  Minimal.    HEMOSTASIS:  Ankle tourniquet at 250 mmHg. INJECTABLE:  None. INDICATION FOR PROCEDURE:  This patient is a 59-year-old male patient of mine, presenting with left foot pain secondary to the above-mentioned diagnoses. The patient has exhausted all conservative measures at this time and has elected to undergo surgical intervention. All risks, benefits, indications, complications, and alternatives were discussed at length with the patient. All questions were answered to the patient's apparent satisfaction. Signed informed consent was placed in the chart. PROCEDURE IN DETAIL:  The patient was brought from the preoperative holding area to the operative room and placed on the operating room table in a secured supine position. The above-mentioned anesthesia was administered per the Anesthesia team.  A well-padded pneumatic ankle tourniquet was applied to the left lower extremity. The left lower extremity was then prepped and draped utilizing sterile aseptic technique and ChloraPrep scrub. A time-out was performed and all were in agreement.   The left lower

## 2023-12-27 ENCOUNTER — APPOINTMENT (OUTPATIENT)
Facility: HOSPITAL | Age: 68
End: 2023-12-27
Payer: MEDICARE

## 2023-12-27 ENCOUNTER — HOSPITAL ENCOUNTER (EMERGENCY)
Facility: HOSPITAL | Age: 68
Discharge: HOME | End: 2023-12-27
Attending: STUDENT IN AN ORGANIZED HEALTH CARE EDUCATION/TRAINING PROGRAM
Payer: MEDICARE

## 2023-12-27 VITALS
SYSTOLIC BLOOD PRESSURE: 119 MMHG | WEIGHT: 175 LBS | HEART RATE: 78 BPM | DIASTOLIC BLOOD PRESSURE: 69 MMHG | RESPIRATION RATE: 16 BRPM | OXYGEN SATURATION: 98 % | BODY MASS INDEX: 25.92 KG/M2 | TEMPERATURE: 97.6 F | HEIGHT: 69 IN

## 2023-12-27 DIAGNOSIS — T42.6X1A ACCIDENTAL ZOLPIDEM OVERDOSE, INITIAL ENCOUNTER: Primary | ICD-10-CM
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
## 2023-12-27 PROCEDURE — 93005 ELECTROCARDIOGRAM TRACING: CPT | Performed by: STUDENT IN AN ORGANIZED HEALTH CARE EDUCATION/TRAINING PROGRAM

## 2023-12-27 PROCEDURE — 70450 CT HEAD/BRAIN W/O DYE: CPT

## 2023-12-27 PROCEDURE — 99284 EMERGENCY DEPT VISIT MOD MDM: CPT

## 2023-12-27 PROCEDURE — 72125 CT NECK SPINE W/O DYE: CPT

## 2023-12-27 RX ORDER — TAMSULOSIN HYDROCHLORIDE 0.4 MG/1
CAPSULE ORAL
COMMUNITY
Start: 2019-04-17
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
## 2023-12-27 NOTE — ED NOTES
Pt is alert and oriented, speaking in full sentences. . Pt's family states that they are positive that the pt took Ambien this am with his morning meds. Pt and family have deferred blood draw at this time. Dr. Charis Baca notified.
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
## 2023-12-27 NOTE — ED NOTES
Pt left before receiving discharge paperwork. The pt was ambulatory with family driving. Dr. Raghavendra Coates did complete her verbal discharge instructions.

## 2023-12-29 LAB
EKG ATRIAL RATE: 54 BPM
EKG DIAGNOSIS: NORMAL
EKG P AXIS: 49 DEGREES
EKG P-R INTERVAL: 204 MS
EKG Q-T INTERVAL: 442 MS
EKG QRS DURATION: 82 MS
EKG QTC CALCULATION (BAZETT): 419 MS
EKG R AXIS: 18 DEGREES
EKG T AXIS: -1 DEGREES
EKG VENTRICULAR RATE: 54 BPM

## 2023-12-29 PROCEDURE — 93010 ELECTROCARDIOGRAM REPORT: CPT | Performed by: SPECIALIST

## 2025-05-23 ENCOUNTER — TRANSCRIBE ORDERS (OUTPATIENT)
Facility: HOSPITAL | Age: 70
End: 2025-05-23

## 2025-05-23 DIAGNOSIS — C61 PROSTATE CANCER (HCC): Primary | ICD-10-CM

## 2025-06-09 ENCOUNTER — HOSPITAL ENCOUNTER (OUTPATIENT)
Facility: HOSPITAL | Age: 70
Discharge: HOME | End: 2025-06-12
Attending: UROLOGY
Payer: MEDICARE

## 2025-06-09 DIAGNOSIS — C61 PROSTATE CANCER (HCC): ICD-10-CM

## 2025-06-09 PROCEDURE — 2580000003 HC RX 258: Performed by: UROLOGY

## 2025-06-09 PROCEDURE — 6360000004 HC RX CONTRAST MEDICATION: Performed by: HOSPITALIST

## 2025-06-09 PROCEDURE — 72197 MRI PELVIS W/O & W/DYE: CPT

## 2025-06-09 PROCEDURE — A9579 GAD-BASE MR CONTRAST NOS,1ML: HCPCS | Performed by: HOSPITALIST

## 2025-06-09 RX ORDER — GADOTERIDOL 279.3 MG/ML
17 INJECTION INTRAVENOUS
Status: COMPLETED | OUTPATIENT
Start: 2025-06-09 | End: 2025-06-09

## 2025-06-09 RX ADMIN — SODIUM CHLORIDE 100 ML: 9 INJECTION, SOLUTION INTRAVENOUS at 19:20

## 2025-06-09 RX ADMIN — GADOTERIDOL 17 ML: 279.3 INJECTION, SOLUTION INTRAVENOUS at 19:41

## 2025-06-10 RX ORDER — 0.9 % SODIUM CHLORIDE 0.9 %
100 INTRAVENOUS SOLUTION INTRAVENOUS ONCE
Status: COMPLETED | OUTPATIENT
Start: 2025-06-10 | End: 2025-06-09

## (undated) DEVICE — C-ARM: Brand: UNBRANDED

## (undated) DEVICE — SOL IRR GLYC 1.5 % 3000ML --

## (undated) DEVICE — BAG DRNGE 4000ML CONT IRRIG ROUNDED TEARDROP SHP DISP

## (undated) DEVICE — SUTURE ETHLN SZ 3-0 L18IN NONABSORBABLE BLK PS-2 L19MM 3/8 1669H

## (undated) DEVICE — SUTURE VCRL SZ 2-0 L27IN ABSRB UD L26MM SH 1/2 CIR J417H

## (undated) DEVICE — 3M™ RANGER™ FLUID WARMING IRRIGATION SET, 24750, 10/CASE: Brand: 3M™ RANGER™

## (undated) DEVICE — CYSTOSCOPY PACK: Brand: CONVERTORS

## (undated) DEVICE — SYR 10ML LUER LOK 1/5ML GRAD --

## (undated) DEVICE — TIDISHIELD UROLOGY DRAIN BAGS FROSTY VINYL STERILE FITS SIEMENS UROSKOP ACCESS 20 PER CASE: Brand: TIDISHIELD

## (undated) DEVICE — Device

## (undated) DEVICE — INFECTION CONTROL KIT SYS

## (undated) DEVICE — SOLUTION IRRIG 500ML 0.9% SOD CHLO USP POUR PLAS BTL

## (undated) DEVICE — SKIN MARKER,REGULAR TIP WITH RULER AND LABELS: Brand: DEVON

## (undated) DEVICE — MEDI-VAC NON-CONDUCTIVE SUCTION TUBING: Brand: CARDINAL HEALTH

## (undated) DEVICE — SYRINGE CATH TIP 50ML

## (undated) DEVICE — GOWN,SIRUS,POLYRNF,BRTHSLV,XL,30/CS: Brand: MEDLINE

## (undated) DEVICE — SUTURE VCRL + SZ 3-0 L27IN ABSRB UD L26MM SH 1/2 CIR VCP416H

## (undated) DEVICE — KENDALL SCD EXPRESS SLEEVES, KNEE LENGTH, MEDIUM: Brand: KENDALL SCD

## (undated) DEVICE — Z DISCONTINUEDSOLUTION PREP 2OZ 10% POVIDONE IOD SCR CAP BTL

## (undated) DEVICE — SOLUTION IRRIG 1000ML H2O STRL BLT

## (undated) DEVICE — STERILE POLYISOPRENE POWDER-FREE SURGICAL GLOVES WITH EMOLLIENT COATING: Brand: PROTEXIS

## (undated) DEVICE — BAG DRAIN URIN 2000ML LF STRL -- CONVERT TO ITEM 363123

## (undated) DEVICE — BLADE SAW W5.5XL18MM THK0.38MM CUT THK0.43MM REPL S STL SAG

## (undated) DEVICE — ZIMMER® STERILE DISPOSABLE TOURNIQUET CUFF WITH PROTECTIVE SLEEVE AND PLC, DUAL PORT, SINGLE BLADDER, 18 IN. (46 CM)

## (undated) DEVICE — SINGLE BASIN: Brand: CARDINAL HEALTH

## (undated) DEVICE — CATHETER URETH 22FR BLLN 5CC STD LTX 3 W F TWO OPP DRNGE

## (undated) DEVICE — GUIDEWIRE ENDOSCP L150CM DIA0.035IN TIP L15CM BENT PTFE

## (undated) DEVICE — GUIDEWIRE ORTH DIA0.034IN W/ TRCR TIP LSR MRK DISP FOR MIC

## (undated) DEVICE — CATHETER PLUG WITH CAP: Brand: DOVER

## (undated) DEVICE — REM POLYHESIVE ADULT PATIENT RETURN ELECTRODE: Brand: VALLEYLAB

## (undated) DEVICE — Z DISCONTINUED USE 2599823 ELECTRODE ENDO 27FR 0.3MM MPLR LOOP DISP

## (undated) DEVICE — BLADE,CARBON-STEEL,15,STRL,DISPOSABLE,TB: Brand: MEDLINE

## (undated) DEVICE — DRESSING PETRO W3XL3IN OIL EMUL N ADH GZ KNIT IMPREG CELOS

## (undated) DEVICE — TUR SERIES SET

## (undated) DEVICE — SUTURE VCRL SZ 3-0 L27IN ABSRB UD L26MM SH 1/2 CIR J416H

## (undated) DEVICE — CATH URETH FOL 3W SH 24FRX5ML -- LUBRICATH

## (undated) DEVICE — CATHETER URETH 22FR BLLN 5CC STD LTX 2 W TWO OPP DRNGE EYE

## (undated) DEVICE — GDWIRE AMPLTZ 0.035INX145CM SS -- BX/5

## (undated) DEVICE — EXTREMITY-SFMCASU: Brand: MEDLINE INDUSTRIES, INC.